# Patient Record
Sex: FEMALE | Race: WHITE | Employment: UNEMPLOYED | URBAN - METROPOLITAN AREA
[De-identification: names, ages, dates, MRNs, and addresses within clinical notes are randomized per-mention and may not be internally consistent; named-entity substitution may affect disease eponyms.]

---

## 2021-08-17 ENCOUNTER — HOSPITAL ENCOUNTER (EMERGENCY)
Age: 46
Discharge: HOME | End: 2021-08-17
Attending: EMERGENCY MEDICINE
Payer: MEDICAID

## 2021-08-17 VITALS
WEIGHT: 168.87 LBS | HEART RATE: 73 BPM | BODY MASS INDEX: 26.51 KG/M2 | RESPIRATION RATE: 18 BRPM | SYSTOLIC BLOOD PRESSURE: 131 MMHG | HEIGHT: 67 IN | DIASTOLIC BLOOD PRESSURE: 84 MMHG | OXYGEN SATURATION: 97 % | TEMPERATURE: 97.8 F

## 2021-08-17 DIAGNOSIS — F31.9 BIPOLAR 1 DISORDER (HCC): Primary | ICD-10-CM

## 2021-08-17 PROCEDURE — 99281 EMR DPT VST MAYX REQ PHY/QHP: CPT

## 2021-08-17 RX ORDER — QUETIAPINE FUMARATE 100 MG/1
100 TABLET, FILM COATED ORAL 2 TIMES DAILY
COMMUNITY

## 2021-08-17 RX ORDER — CLONIDINE HYDROCHLORIDE 0.2 MG/1
0.2 TABLET ORAL
COMMUNITY

## 2021-08-17 RX ORDER — ATOMOXETINE 60 MG/1
60 CAPSULE ORAL DAILY
COMMUNITY

## 2021-08-17 RX ORDER — NORETHINDRONE 5 MG/1
5 TABLET ORAL DAILY
COMMUNITY

## 2021-08-17 NOTE — BSMART NOTE
Comprehensive Assessment Form Part 1      Section I - Disposition    Axis I - Bipolar I Disorder by hx  Axis II - Deferred  Axis III -   Past Medical History:   Diagnosis Date    Anemia     Anxiety     Liver disease     Seizure [facility])        The Medical Doctor to Psychiatrist conference was not completed. The Medical Doctor is in agreement with Psychiatrist disposition because of (reason) patient not meeting criteria for inpatient admission. The plan is discharge and follow-up with psychiatrist.  The on-call Psychiatrist consulted was Dr. Bryson Jacob. The admitting Psychiatrist will be Dr. Bryson Jacob. The admitting Diagnosis is N/A. The Payor source is [address] [place] [address]  Section II - Integrated Summary    Summary:  Patient is a 48 y.o. female arriving to the ED per triage, \"Pt ambulatory requesting to be detoxed from clonidine. Pt denies SI/HI/hallucinations. She states she does not feel like she needs to be admitted for mental health. She was told she needed to come to the ED to be detoxed. \"    Patient is alert and oriented x 4. Patient presents as anxious. Patient is cooperative and pleasant. Patient denies SI/HI/AVH. Patient reported a history of Bipolar Disorder and stated she came off of a 6 month manic episode last Friday. Patient reported she was taking Delsym everyday since February to \"help mellow me out. \" Patient reported she was still taking the Delsym and stated, \"I guess I'm still addicted. \" Patient reported she was prescribed Clonidine by her psychiatrist as panic attacks often come with her manic episodes. Patient reported she had run out of Clonidine a few days earlier then she could get a refill because someone had taken a few of her pills. Patient reported she was told that she would have to go to the hospital for detox if she was going to be out of her Clonidine. Patient reported she has since had her Clonidine refilled.  Patient reported she is moving into a depressive episode and \"I'm worried the chemicals in my brain are off and I probably need a change in medications\". Patient reported feeling concerned as she is not thinking clearly and not making the right decisions. Patient is currently prescribed Hydroxyzine, Seroquel, Prozac, and Stratera. Patient reported taking medication as prescribed. Patient has an appointment on 8/25 with her psychiatrist, Dr. Talha Campuzano. Patient has a prior psychiatric hospitalization in 2014 for substance abuse detox. Patient denies current drug use. Patient to be discharged and follow-up with psychiatrist. Patient encouraged to call psychiatrist in the morning to see if she can be seen sooner than next appointment. Patient provided with list of therapy providers. Patient was also given information for hospital that has a detox unit. The patienthas demonstrated mental capacity to provide informed consent. The information is given by the patient. The Chief Complaint is Clonidine detox. The Precipitant Factors are None noted. Previous Hospitalizations: Yes-2014 [facility]  The patient has not previously been in restraints. Current Psychiatrist and/or  is Dr. Michelle Mari (appt on 8/25)    Lethality Assessment:    The potential for suicide noted by the following: not noted. The potential for homicide is not noted. The patient has not been a perpetrator of sexual or physical abuse. There are not pending charges. The patient is not felt to be at risk for self harm or harm to others. The attending nurse was advised that security has not been notified. Section III - Psychosocial  The patient's overall mood and attitude is anxious and cooperative. Feelings of helplessness and hopelessness are not observed. Generalized anxiety is not observed. Panic is not observed. Phobias are not observed. Obsessive compulsive tendencies are not observed. Section IV - Mental Status Exam  The patient's appearance shows no evidence of impairment.   The patient's behavior shows no evidence of impairment. The patient is oriented to time, place, person and situation. The patient's speech shows no evidence of impairment. The patient's mood is anxious. The range of affect shows no evidence of impairment. The patient's thought content demonstrates no evidence of impairment. The thought process shows no evidence of impairment. The patient's perception shows no evidence of impairment. The patient's memory shows no evidence of impairment. The patient's appetite shows no evidence of impairment. The patient's sleep shows no evidence of impairment. The patient's insight shows no evidence of impairment. The patient's judgement shows no evidence of impairment. Section V - Substance Abuse  The patient is not using substances. Section VI - Living Arrangements  The patient is single. The patient lives with a child. The patient has one child age. The patient does plan to return home upon discharge. The patient does not have legal issues pending. The patient's source of income comes from disability. Protestant and cultural practices have not been voiced at this time. The patient's greatest support comes from son and this person will not be involved with the treatment. The patient has not been in an event described as horrible or outside the realm of ordinary life experience either currently or in the past.  The patient has not been a victim of sexual/physical abuse. Section VII - Other Areas of Clinical Concern  The highest grade achieved is not noted with the overall quality of school experience being described as not noted. The patient is currently disabled and speaks [place] as a primary language. The patient has no communication impairments affecting communication. The patient's preference for learning can be described as: can read and write adequately.   The patient's hearing is normal.  The patient's vision is normal.      Jc Magaña MSW

## 2021-08-17 NOTE — ED NOTES
Pt given written and verbal discharge instructions, 0 Rx; pt verbalized understanding of such. VSS at time of discharge. Belongings in pt possession at time of discharge. Pt ambulatory out of ED without difficulty in NAD. No complaints, needs, or questions at this time. Pt to call psychiatrist ASAP for follow-up.

## 2021-08-17 NOTE — ED TRIAGE NOTES
Pt ambulatory requesting to be detoxed from clonidine. Pt denies SI/HI/hallucinations. She states she does not feel like she needs to be admitted for mental health. She was told she needed to come to the ED to be detoxed.

## 2021-08-17 NOTE — ED PROVIDER NOTES
HPI     52-year-old female with a history of bipolar disorder, anxiety, presents to the emergency department requesting detox from her clonidine. Patient states she just got off of a manic episode and is feeling anxious. She wants to stop her clonidine. She does have a psychiatrist.  She denies feeling suicidal or homicidal.  She denies hallucinations. She denies any fever chest pain cough shortness of breath. She states she has not been vaccinated against Covid. She denies any nausea vomiting or diarrhea. Past Medical History:   Diagnosis Date    Anemia     Anxiety     Liver disease     Seizure [facility])        Past Surgical History:   Procedure Laterality Date    HX OTHER SURGICAL      left arm broken         No family history on file. Social History     Socioeconomic History    Marital status: SINGLE     Spouse name: Not on file    Number of children: Not on file    Years of education: Not on file    Highest education level: Not on file   Occupational History    Not on file   Tobacco Use    Smoking status: Never Smoker   Substance and Sexual Activity    Alcohol use: Yes     Comment: three to five beers a day    Drug use: Not on file    Sexual activity: Not on file   Other Topics Concern    Not on file   Social History Narrative    Not on file     Social Determinants of Health     Financial Resource Strain:     Difficulty of Paying Living Expenses:    Food Insecurity:     Worried About Running Out of Food in the Last Year:     [address] in the Last Year:    Transportation Needs:     Lack of Transportation (Medical):      Lack of Transportation (Non-Medical):    Physical Activity:     Days of Exercise per Week:     Minutes of Exercise per Session:    Stress:     Feeling of Stress :    Social Connections:     Frequency of Communication with Friends and Family:     Frequency of Social Gatherings with Friends and Family:     Attends Pentecostalism Services:     Active Member of Clubs or Organizations:     Attends Club or Organization Meetings:     Marital Status:    Intimate Partner Violence:     Fear of Current or Ex-Partner:     Emotionally Abused:     Physically Abused:     Sexually Abused: ALLERGIES: Patient has no known allergies. Review of Systems   Constitutional: Negative for fever. Eyes: Negative for visual disturbance. Respiratory: Negative for cough and shortness of breath. Cardiovascular: Negative for chest pain. Gastrointestinal: Negative for nausea and vomiting. Endocrine: Negative for polyuria. Genitourinary: Negative for dysuria. Musculoskeletal: Positive for gait problem. Neurological: Negative for headaches. Psychiatric/Behavioral: Positive for dysphoric mood. Negative for hallucinations and suicidal ideas. The patient is nervous/anxious. Vitals:    08/17/21 0251   BP: 131/84   Pulse: 73   Resp: 18   Temp: 97.8 °F (36.6 °C)   SpO2: 97%   Weight: 76.6 kg (168 lb 14 oz)   Height: 5' 7\" (1.702 m)            Physical Exam  Constitutional:       General: She is not in acute distress. Appearance: She is well-developed. HENT:      Head: Normocephalic and atraumatic. Mouth/Throat:      Pharynx: No oropharyngeal exudate. Eyes:      General: No scleral icterus. Right eye: No discharge. Left eye: No discharge. Pupils: Pupils are equal, round, and reactive to light. Neck:      Vascular: No JVD. Cardiovascular:      Rate and Rhythm: Normal rate and regular rhythm. Heart sounds: Normal heart sounds. No murmur heard. Pulmonary:      Effort: Pulmonary effort is normal. No respiratory distress. Breath sounds: Normal breath sounds. No stridor. No wheezing or rales. Chest:      Chest wall: No tenderness. Abdominal:      General: Bowel sounds are normal. There is no distension. Palpations: Abdomen is soft. There is no mass. Tenderness: There is no abdominal tenderness.  There is no guarding or rebound. Musculoskeletal:         General: Normal range of motion. Cervical back: Normal range of motion and neck supple. Skin:     General: Skin is warm and dry. Capillary Refill: Capillary refill takes less than 2 seconds. Findings: No rash. Neurological:      Mental Status: She is oriented to person, place, and time. Psychiatric:         Behavior: Behavior normal.         Thought Content: Thought content normal.         Judgment: Judgment normal.          MDM       Procedures    Patient was evaluated by mental health. They do not feel she meets criteria for admission. Patient has follow-up with her psychiatrist next week.

## 2021-08-17 NOTE — DISCHARGE INSTRUCTIONS
You have been evaluated by our mental health provider in the ED. You have follow up with your psychiatrist next week. Call and see if you can be seen earlier. Continue all your medications as prescribed. If you start having thoughts of harming yourself/suicidal, return here.

## 2022-04-25 ENCOUNTER — HOSPITAL ENCOUNTER (EMERGENCY)
Age: 47
Discharge: HOME | End: 2022-04-25
Attending: STUDENT IN AN ORGANIZED HEALTH CARE EDUCATION/TRAINING PROGRAM
Payer: MEDICAID

## 2022-04-25 VITALS
WEIGHT: 124 LBS | SYSTOLIC BLOOD PRESSURE: 139 MMHG | HEIGHT: 67 IN | OXYGEN SATURATION: 100 % | TEMPERATURE: 98.2 F | BODY MASS INDEX: 19.46 KG/M2 | HEART RATE: 93 BPM | DIASTOLIC BLOOD PRESSURE: 91 MMHG | RESPIRATION RATE: 18 BRPM

## 2022-04-25 DIAGNOSIS — F31.9 BIPOLAR 1 DISORDER (HCC): Primary | ICD-10-CM

## 2022-04-25 DIAGNOSIS — F43.9 STRESS: ICD-10-CM

## 2022-04-25 DIAGNOSIS — F51.01 PRIMARY INSOMNIA: ICD-10-CM

## 2022-04-25 DIAGNOSIS — E86.0 DEHYDRATION: ICD-10-CM

## 2022-04-25 LAB
ALBUMIN SERPL-MCNC: 3.8 G/DL (ref 3.4–5)
ALBUMIN/GLOB SERPL: 1 {RATIO} (ref 0.8–1.7)
ALP SERPL-CCNC: 48 U/L (ref 45–117)
ALT SERPL-CCNC: 41 U/L (ref 13–56)
AMPHET UR QL SCN: NEGATIVE
ANION GAP SERPL CALC-SCNC: 3 MMOL/L (ref 3–18)
APAP SERPL-MCNC: <2 UG/ML (ref 10–30)
APPEARANCE UR: CLEAR
AST SERPL-CCNC: 38 U/L (ref 10–38)
BARBITURATES UR QL SCN: NEGATIVE
BASOPHILS # BLD: 0.1 K/UL (ref 0–0.1)
BASOPHILS NFR BLD: 1 % (ref 0–2)
BENZODIAZ UR QL: NEGATIVE
BILIRUB SERPL-MCNC: 0.7 MG/DL (ref 0.2–1)
BILIRUB UR QL: NEGATIVE
BUN SERPL-MCNC: 7 MG/DL (ref 7–18)
BUN/CREAT SERPL: 10 (ref 12–20)
CALCIUM SERPL-MCNC: 8.7 MG/DL (ref 8.5–10.1)
CANNABINOIDS UR QL SCN: NEGATIVE
CHLORIDE SERPL-SCNC: 108 MMOL/L (ref 100–111)
CO2 SERPL-SCNC: 25 MMOL/L (ref 21–32)
COCAINE UR QL SCN: NEGATIVE
COLOR UR: YELLOW
COVID-19 RAPID TEST, COVR: NOT DETECTED
CREAT SERPL-MCNC: 0.7 MG/DL (ref 0.6–1.3)
DIFFERENTIAL METHOD BLD: ABNORMAL
EOSINOPHIL # BLD: 0.1 K/UL (ref 0–0.4)
EOSINOPHIL NFR BLD: 1 % (ref 0–5)
ERYTHROCYTE [DISTWIDTH] IN BLOOD BY AUTOMATED COUNT: 14.6 % (ref 11.6–14.5)
ETHANOL SERPL-MCNC: <3 MG/DL (ref 0–3)
GLOBULIN SER CALC-MCNC: 3.9 G/DL (ref 2–4)
GLUCOSE SERPL-MCNC: 69 MG/DL (ref 74–99)
GLUCOSE UR STRIP.AUTO-MCNC: NEGATIVE MG/DL
HCG UR QL: NEGATIVE
HCT VFR BLD AUTO: 37 % (ref 35–45)
HDSCOM,HDSCOM: NORMAL
HGB BLD-MCNC: 12.4 G/DL (ref 12–16)
HGB UR QL STRIP: NEGATIVE
IMM GRANULOCYTES # BLD AUTO: 0 K/UL (ref 0–0.04)
IMM GRANULOCYTES NFR BLD AUTO: 0 % (ref 0–0.5)
KETONES UR QL STRIP.AUTO: NEGATIVE MG/DL
LEUKOCYTE ESTERASE UR QL STRIP.AUTO: NEGATIVE
LYMPHOCYTES # BLD: 1.4 K/UL (ref 0.9–3.6)
LYMPHOCYTES NFR BLD: 15 % (ref 21–52)
MCH RBC QN AUTO: 30.5 PG (ref 24–34)
MCHC RBC AUTO-ENTMCNC: 33.5 G/DL (ref 31–37)
MCV RBC AUTO: 90.9 FL (ref 78–100)
METHADONE UR QL: NEGATIVE
MONOCYTES # BLD: 0.8 K/UL (ref 0.05–1.2)
MONOCYTES NFR BLD: 8 % (ref 3–10)
NEUTS SEG # BLD: 7.3 K/UL (ref 1.8–8)
NEUTS SEG NFR BLD: 75 % (ref 40–73)
NITRITE UR QL STRIP.AUTO: NEGATIVE
NRBC # BLD: 0 K/UL (ref 0–0.01)
NRBC BLD-RTO: 0 PER 100 WBC
OPIATES UR QL: NEGATIVE
PCP UR QL: NEGATIVE
PH UR STRIP: 6.5 [PH] (ref 5–8)
PLATELET # BLD AUTO: 332 K/UL (ref 135–420)
PMV BLD AUTO: 9.8 FL (ref 9.2–11.8)
POTASSIUM SERPL-SCNC: 3.6 MMOL/L (ref 3.5–5.5)
PROT SERPL-MCNC: 7.7 G/DL (ref 6.4–8.2)
PROT UR STRIP-MCNC: NEGATIVE MG/DL
RBC # BLD AUTO: 4.07 M/UL (ref 4.2–5.3)
SALICYLATES SERPL-MCNC: <1.7 MG/DL (ref 2.8–20)
SODIUM SERPL-SCNC: 136 MMOL/L (ref 136–145)
SOURCE, COVRS: NORMAL
SP GR UR REFRACTOMETRY: <1.005 (ref 1–1.03)
UROBILINOGEN UR QL STRIP.AUTO: 0.2 EU/DL (ref 0.2–1)
WBC # BLD AUTO: 9.7 K/UL (ref 4.6–13.2)

## 2022-04-25 PROCEDURE — 82077 ASSAY SPEC XCP UR&BREATH IA: CPT

## 2022-04-25 PROCEDURE — 80179 DRUG ASSAY SALICYLATE: CPT

## 2022-04-25 PROCEDURE — 80307 DRUG TEST PRSMV CHEM ANLYZR: CPT

## 2022-04-25 PROCEDURE — 99284 EMERGENCY DEPT VISIT MOD MDM: CPT

## 2022-04-25 PROCEDURE — 74011250636 HC RX REV CODE- 250/636: Performed by: PHYSICIAN ASSISTANT

## 2022-04-25 PROCEDURE — 81025 URINE PREGNANCY TEST: CPT

## 2022-04-25 PROCEDURE — 80143 DRUG ASSAY ACETAMINOPHEN: CPT

## 2022-04-25 PROCEDURE — 80053 COMPREHEN METABOLIC PANEL: CPT

## 2022-04-25 PROCEDURE — 96361 HYDRATE IV INFUSION ADD-ON: CPT

## 2022-04-25 PROCEDURE — 87635 SARS-COV-2 COVID-19 AMP PRB: CPT

## 2022-04-25 PROCEDURE — 81003 URINALYSIS AUTO W/O SCOPE: CPT

## 2022-04-25 PROCEDURE — 96360 HYDRATION IV INFUSION INIT: CPT

## 2022-04-25 PROCEDURE — 85025 COMPLETE CBC W/AUTO DIFF WBC: CPT

## 2022-04-25 RX ADMIN — SODIUM CHLORIDE 1000 ML: 9 INJECTION, SOLUTION INTRAVENOUS at 17:14

## 2022-04-25 NOTE — ED PROVIDER NOTES
EMERGENCY DEPARTMENT HISTORY AND PHYSICAL EXAM    Date: 4/25/2022  Patient Name: Paolo Donaldson    History of Presenting Illness     Chief Complaint   Patient presents with    Dehydration         History Provided By: Patient    Chief Complaint: \"I don't feel right\", bipolar, \"feel dehydrated\"  Duration: this afternoon  Timing:    Location:   Quality:   Severity:   Modifying Factors:   Associated Symptoms: none       Additional History (Context): Paolo Donaldson is a 50 y.o. female with a history of bipolar disorder, depression presents for evaluation of possible dehydration, possibly manic phase. States she \"doesn't feel right\" but also thinks she is dehydrated. Came to town to help her son at the [facility]. Took her medication this morning, but is having highs and lows, and has been walking around the ED hallways appearing anxious. I asked her if she felt like she wanted to harm herself or others, and she said she \"just can't take it anymore\" but endorses no plan, just that she feels overwhelmed and stressed. Is tearful, and then in the next moment is upbeat and talking with nursing staff. Informed nursing staff she has not been sleeping well and thinks this might be why she doesn't feel well. Denies pain, abdominal pain, N/V, urinary symptoms, headache. Denies tobacco, alcohol or drug use. PCP: None    Current Outpatient Medications   Medication Sig Dispense Refill    norethindrone acetate (AYGESTIN) 5 mg tablet Take 5 mg by mouth daily.  cloNIDine HCL (CATAPRES) 0.2 mg tablet Take 0.2 mg by mouth three (3) times daily as needed.  QUEtiapine (SEROquel) 100 mg tablet Take 100 mg by mouth two (2) times a day. Take 1/2 tablet by mouth daily with breakfast and 1 tablet at bedtime      atomoxetine (STRATTERA) 60 mg capsule Take 60 mg by mouth daily.          Past History     Past Medical History:  Past Medical History:   Diagnosis Date    Anemia     Anxiety     Liver disease     Seizure ([facility] [address]        Past Surgical History:  Past Surgical History:   Procedure Laterality Date    HX OTHER SURGICAL      left arm broken       Family History:  History reviewed. No pertinent family history. Social History:  Social History     Tobacco Use    Smoking status: Never Smoker    Smokeless tobacco: Never Used   Vaping Use    Vaping Use: Never used   Substance Use Topics    Alcohol use: Yes     Comment: three to five beers a day    Drug use: Not Currently       Allergies:  No Known Allergies      Review of Systems   Review of Systems   Constitutional: Positive for fatigue. Negative for chills and fever. HENT: Negative for congestion and trouble swallowing. Respiratory: Negative for shortness of breath and wheezing. Cardiovascular: Negative for chest pain and palpitations. Gastrointestinal: Negative for abdominal pain. Genitourinary: Negative for flank pain and menstrual problem. Musculoskeletal: Negative for arthralgias, back pain and myalgias. Skin: Negative for rash. Neurological: Negative for dizziness, weakness and headaches. Psychiatric/Behavioral: Positive for agitation, dysphoric mood and sleep disturbance. Negative for confusion, hallucinations, self-injury and suicidal ideas. The patient is nervous/anxious. All Other Systems Negative  Physical Exam     Vitals:    04/25/22 1553 04/25/22 1555 04/25/22 1557 04/25/22 1926   BP: (!) 164/104 (!) 157/103 (!) 139/91    Pulse: 89 95 93    Resp:       Temp:       SpO2: 99% 98% 98% 100%   Weight:       Height:         Physical Exam  Constitutional:       Appearance: Normal appearance. Comments: Appears anxious, and tearful during conversation     HENT:      Head: Normocephalic and atraumatic. Mouth/Throat:      Mouth: Mucous membranes are dry. Pharynx: Oropharynx is clear. Eyes:      Extraocular Movements: Extraocular movements intact.       Conjunctiva/sclera: Conjunctivae normal.   Cardiovascular:      Rate and Rhythm: Normal rate and regular rhythm. Pulses: Normal pulses. Heart sounds: Normal heart sounds. Pulmonary:      Effort: Pulmonary effort is normal.      Breath sounds: Normal breath sounds. Abdominal:      Palpations: Abdomen is soft. Musculoskeletal:         General: Normal range of motion. Skin:     General: Skin is warm and dry. Capillary Refill: Capillary refill takes less than 2 seconds. Neurological:      General: No focal deficit present. Mental Status: She is alert and oriented to person, place, and time. Sensory: No sensory deficit. Motor: No weakness. Coordination: Coordination normal.      Gait: Gait normal.   Psychiatric:         Attention and Perception: Attention normal. She is attentive. She does not perceive auditory or visual hallucinations. Mood and Affect: Mood is anxious. Affect is tearful. Speech: Speech is rapid and pressured. Behavior: Behavior normal. Behavior is cooperative. Thought Content: Thought content is not paranoid or delusional. Thought content does not include homicidal or suicidal ideation. Thought content does not include homicidal or suicidal plan.          Cognition and Memory: Cognition normal.           Diagnostic Study Results     Labs -     Recent Results (from the past 12 hour(s))   URINALYSIS W/ RFLX MICROSCOPIC    Collection Time: 04/25/22  3:30 PM   Result Value Ref Range    Color YELLOW      Appearance CLEAR      Specific gravity <1.005 (L) 1.005 - 1.030    pH (UA) 6.5 5.0 - 8.0      Protein Negative NEG mg/dL    Glucose Negative NEG mg/dL    Ketone Negative NEG mg/dL    Bilirubin Negative NEG      Blood Negative NEG      Urobilinogen 0.2 0.2 - 1.0 EU/dL    Nitrites Negative NEG      Leukocyte Esterase Negative NEG     HCG URINE, QL    Collection Time: 04/25/22  3:30 PM   Result Value Ref Range    HCG urine, QL Negative NEG     DRUG SCREEN, URINE    Collection Time: 04/25/22  3:30 PM   Result Value Ref Range BENZODIAZEPINES Negative NEG      BARBITURATES Negative NEG      THC (TH-CANNABINOL) Negative NEG      OPIATES Negative NEG      PCP(PHENCYCLIDINE) Negative NEG      COCAINE Negative NEG      AMPHETAMINES Negative NEG      METHADONE Negative NEG      HDSCOM (NOTE)    CBC WITH AUTOMATED DIFF    Collection Time: 04/25/22  4:00 PM   Result Value Ref Range    WBC 9.7 4.6 - 13.2 K/uL    RBC 4.07 (L) 4.20 - 5.30 M/uL    HGB 12.4 12.0 - 16.0 g/dL    HCT 37.0 35.0 - 45.0 %    MCV 90.9 78.0 - 100.0 FL    MCH 30.5 24.0 - 34.0 PG    MCHC 33.5 31.0 - 37.0 g/dL    RDW 14.6 (H) 11.6 - 14.5 %    PLATELET 304 876 - 462 K/uL    MPV 9.8 9.2 - 11.8 FL    NRBC 0.0 0  WBC    ABSOLUTE NRBC 0.00 0.00 - 0.01 K/uL    NEUTROPHILS 75 (H) 40 - 73 %    LYMPHOCYTES 15 (L) 21 - 52 %    MONOCYTES 8 3 - 10 %    EOSINOPHILS 1 0 - 5 %    BASOPHILS 1 0 - 2 %    IMMATURE GRANULOCYTES 0 0.0 - 0.5 %    ABS. NEUTROPHILS 7.3 1.8 - 8.0 K/UL    ABS. LYMPHOCYTES 1.4 0.9 - 3.6 K/UL    ABS. MONOCYTES 0.8 0.05 - 1.2 K/UL    ABS. EOSINOPHILS 0.1 0.0 - 0.4 K/UL    ABS. BASOPHILS 0.1 0.0 - 0.1 K/UL    ABS. IMM. GRANS. 0.0 0.00 - 0.04 K/UL    DF AUTOMATED     METABOLIC PANEL, COMPREHENSIVE    Collection Time: 04/25/22  4:00 PM   Result Value Ref Range    Sodium 136 136 - 145 mmol/L    Potassium 3.6 3.5 - 5.5 mmol/L    Chloride 108 100 - 111 mmol/L    CO2 25 21 - 32 mmol/L    Anion gap 3 3.0 - 18 mmol/L    Glucose 69 (L) 74 - 99 mg/dL    BUN 7 7.0 - 18 MG/DL    Creatinine 0.70 0.6 - 1.3 MG/DL    BUN/Creatinine ratio 10 (L) 12 - 20      GFR est AA >60 >60 ml/min/1.73m2    GFR est non-AA >60 >60 ml/min/1.73m2    Calcium 8.7 8.5 - 10.1 MG/DL    Bilirubin, total 0.7 0.2 - 1.0 MG/DL    ALT (SGPT) 41 13 - 56 U/L    AST (SGOT) 38 10 - 38 U/L    Alk.  phosphatase 48 45 - 117 U/L    Protein, total 7.7 6.4 - 8.2 g/dL    Albumin 3.8 3.4 - 5.0 g/dL    Globulin 3.9 2.0 - 4.0 g/dL    A-G Ratio 1.0 0.8 - 1.7     ETHYL ALCOHOL    Collection Time: 04/25/22  4:00 PM   Result Value Ref Range    ALCOHOL(ETHYL),SERUM <3 0 - 3 MG/DL   SALICYLATE    Collection Time: 04/25/22  4:00 PM   Result Value Ref Range    Salicylate level <8.1 (L) 2.8 - 20.0 MG/DL   ACETAMINOPHEN    Collection Time: 04/25/22  4:00 PM   Result Value Ref Range    Acetaminophen level <2 (L) 10.0 - 30.0 ug/mL   COVID-19 RAPID TEST    Collection Time: 04/25/22  4:15 PM   Result Value Ref Range    Specimen source Nasopharyngeal      COVID-19 rapid test Not detected NOTD         Radiologic Studies -   No orders to display     CT Results  (Last 48 hours)    None        CXR Results  (Last 48 hours)    None            Medical Decision Making   I am the first provider for this patient. I reviewed the vital signs, available nursing notes, past medical history, past surgical history, family history and social history. Vital Signs-Reviewed the patient's vital signs. Records Reviewed: Nursing Notes and Old Medical Records     Procedures: None   Procedures    Provider Notes (Medical Decision Making): Concern pt is having passive thoughts of not wanting to be here, stress and tearful. Denies plan to hurt herself, history of suicidal attempts. Will get labs for possible case management, rehydrate the patient and reassess. Check for any other abnormalities such as UTI or electrolyte imbalance. 1943: Dr. Mary Ellen Mcneill evaluated at bedside for concerns of possible SI endorsement, denies SI or HI, and denied to nursing staff multiple times. Endorses contract for safety, will return if symptoms get worse. Has family and friens in the area. States she feels much better and is ready to go home. Has psychiatrist, Dr. Donna Francis, and will set up an appointment with them. Return if any worsening. MED RECONCILIATION:  No current facility-administered medications for this encounter. Current Outpatient Medications   Medication Sig    norethindrone acetate (AYGESTIN) 5 mg tablet Take 5 mg by mouth daily.     cloNIDine HCL (CATAPRES) 0.2 mg tablet Take 0.2 mg by mouth three (3) times daily as needed.  QUEtiapine (SEROquel) 100 mg tablet Take 100 mg by mouth two (2) times a day. Take 1/2 tablet by mouth daily with breakfast and 1 tablet at bedtime    atomoxetine (STRATTERA) 60 mg capsule Take 60 mg by mouth daily. Disposition:  Home     DISCHARGE NOTE:   Pt has been reexamined. Patient has no new complaints, changes, or physical findings. Care plan outlined and precautions discussed. Results of workup were reviewed with the patient. All medications were reviewed with the patient. All of pt's questions and concerns were addressed. Patient was instructed and agrees to follow up with PCP as well as to return to the ED upon further deterioration. Patient is ready to go home. Follow-up Information     Follow up With Specialties Details Why [facility]    [facility] EMERGENCY DEPT Emergency Medicine  If symptoms worsen [address] [place]  [phone]    Yg Matos MD Psychiatry Schedule an appointment as soon as possible for a visit   Trevor Jaquez  [address]  [address] [place] [address]  [phone]            Current Discharge Medication List              Diagnosis     Clinical Impression:   1. Bipolar 1 disorder (HCC)    2. Dehydration    3. Stress    4. Primary insomnia          \"Please note that this dictation was completed with [employer], the computer voice recognition software. Quite often unanticipated grammatical, syntax, homophones, and other interpretive errors are inadvertently transcribed by the computer software. Please disregard these errors. Please excuse any errors that have escaped final proofreading. \"

## 2022-04-25 NOTE — DISCHARGE INSTRUCTIONS
Follow up with your psychiatrist/primary care provider to discuss your medication.    Return to the ER if you develop any worsening symptoms such as pain, fever, vomiting

## 2025-05-06 ENCOUNTER — APPOINTMENT (OUTPATIENT)
Facility: HOSPITAL | Age: 50
End: 2025-05-06
Payer: COMMERCIAL

## 2025-05-06 VITALS
BODY MASS INDEX: 22.46 KG/M2 | OXYGEN SATURATION: 100 % | TEMPERATURE: 98.4 F | HEIGHT: 67 IN | SYSTOLIC BLOOD PRESSURE: 134 MMHG | HEART RATE: 88 BPM | WEIGHT: 143.08 LBS | RESPIRATION RATE: 20 BRPM | DIASTOLIC BLOOD PRESSURE: 90 MMHG

## 2025-05-06 PROCEDURE — 71046 X-RAY EXAM CHEST 2 VIEWS: CPT

## 2025-05-06 PROCEDURE — 99284 EMERGENCY DEPT VISIT MOD MDM: CPT

## 2025-05-06 ASSESSMENT — PAIN - FUNCTIONAL ASSESSMENT: PAIN_FUNCTIONAL_ASSESSMENT: NONE - DENIES PAIN

## 2025-05-07 ENCOUNTER — HOSPITAL ENCOUNTER (EMERGENCY)
Facility: HOSPITAL | Age: 50
Discharge: HOME | End: 2025-05-07
Attending: STUDENT IN AN ORGANIZED HEALTH CARE EDUCATION/TRAINING PROGRAM
Payer: COMMERCIAL

## 2025-05-07 DIAGNOSIS — J06.9 UPPER RESPIRATORY TRACT INFECTION, UNSPECIFIED TYPE: Primary | ICD-10-CM

## 2025-05-07 DIAGNOSIS — R05.1 ACUTE COUGH: ICD-10-CM

## 2025-05-07 LAB
FLUAV RNA SPEC QL NAA+PROBE: NOT DETECTED
FLUBV RNA SPEC QL NAA+PROBE: NOT DETECTED
SARS-COV-2 RNA RESP QL NAA+PROBE: NOT DETECTED
SOURCE: NORMAL

## 2025-05-07 PROCEDURE — 87636 SARSCOV2 & INF A&B AMP PRB: CPT

## 2025-05-07 RX ORDER — BENZONATATE 100 MG/1
100 CAPSULE ORAL 3 TIMES DAILY PRN
Qty: 30 CAPSULE | Refills: 0 | Status: SHIPPED | OUTPATIENT
Start: 2025-05-07 | End: 2025-05-07

## 2025-05-07 RX ORDER — ALBUTEROL SULFATE 90 UG/1
2 INHALANT RESPIRATORY (INHALATION) 4 TIMES DAILY PRN
Qty: 18 G | Refills: 0 | Status: SHIPPED | OUTPATIENT
Start: 2025-05-07

## 2025-05-07 RX ORDER — ALBUTEROL SULFATE 90 UG/1
2 INHALANT RESPIRATORY (INHALATION) 4 TIMES DAILY PRN
Qty: 18 G | Refills: 0 | Status: SHIPPED | OUTPATIENT
Start: 2025-05-07 | End: 2025-05-07

## 2025-05-07 RX ORDER — BENZONATATE 100 MG/1
100 CAPSULE ORAL 3 TIMES DAILY PRN
Qty: 30 CAPSULE | Refills: 0 | Status: SHIPPED | OUTPATIENT
Start: 2025-05-07 | End: 2025-05-17

## 2025-05-07 ASSESSMENT — ENCOUNTER SYMPTOMS: COUGH: 1

## 2025-05-07 NOTE — ED TRIAGE NOTES
Patient in through triage with complaints of productive cough. Family at home sick as well with unknown illness. Pt stumbling and slurred in triage, endorses having a single glass of wine pta.

## 2025-05-07 NOTE — ED PROVIDER NOTES
[facility] EMERGENCY DEPARTMENT  EMERGENCY DEPARTMENT ENCOUNTER      Pt Name: Elvira Reid  MRN: [id number]  Birthdate 1975  Date of evaluation: 5/6/2025  Provider: Gianni Dominguez DO    CHIEF COMPLAINT       Chief Complaint   Patient presents with    Cough         HISTORY OF PRESENT ILLNESS   (Location/Symptom, Timing/Onset, Context/Setting, Quality, Duration, Modifying Factors, Severity)  Note limiting factors.   50-year-old female history as outlined below here today secondary to cough.  She has had cough which is productive for 1 week.  Has had associated nasal/chest congestion.  + Ill contacts with the same.  Not really feeling short of breath.  No fevers.  No chest pain, abdominal pain, leg pain or leg swelling.  She smokes occasionally.  Denies history of COPD or asthma.            Review of External Medical Records:     Nursing Notes were reviewed.    REVIEW OF SYSTEMS    (2-9 systems for level 4, 10 or more for level 5)     Review of Systems   HENT:  Positive for congestion.    Respiratory:  Positive for cough.        Except as noted above the remainder of the review of systems was reviewed and negative.       PAST MEDICAL HISTORY     Past Medical History:   Diagnosis Date    Anemia     Anxiety     Liver disease     Seizure (HCC)          SURGICAL HISTORY       Past Surgical History:   Procedure Laterality Date    OTHER SURGICAL HISTORY      left arm broken    US I&D BREAST ABSCESS DEEP N/A 5/6/2016    US I&D BREAST ABSCESS DEEP         CURRENT MEDICATIONS       Previous Medications    ATOMOXETINE (STRATTERA) 60 MG CAPSULE    Take 60 mg by mouth daily    CLONIDINE (CATAPRES) 0.2 MG TABLET    Take 0.2 mg by mouth 3 times daily as needed    NORETHINDRONE (AYGESTIN) 5 MG TABLET    Take 5 mg by mouth daily    QUETIAPINE (SEROQUEL) 100 MG TABLET    Take 100 mg by mouth 2 times daily       ALLERGIES     Patient has no known allergies.    FAMILY HISTORY     No family history on file.       SOCIAL

## 2025-05-07 NOTE — ED NOTES
Patient discharged by MD Juliana Dominguez pt sent to the front lobby, with strong and steady gait, no acute distress noted at time of discharge - Discharge information / home RX / and reasons to return to the ED were reviewed by the ED provider.

## 2025-05-26 ENCOUNTER — HOSPITAL ENCOUNTER (INPATIENT)
Facility: HOSPITAL | Age: 50
LOS: 2 days | Discharge: HOME | DRG: 753 | End: 2025-05-29
Attending: EMERGENCY MEDICINE | Admitting: PSYCHIATRY & NEUROLOGY
Payer: COMMERCIAL

## 2025-05-26 DIAGNOSIS — F32.A DEPRESSION, UNSPECIFIED DEPRESSION TYPE: Primary | ICD-10-CM

## 2025-05-26 LAB
ALBUMIN SERPL-MCNC: 3.5 G/DL (ref 3.5–5)
ALBUMIN/GLOB SERPL: 1.1 (ref 1.1–2.2)
ALP SERPL-CCNC: 58 U/L (ref 45–117)
ALT SERPL-CCNC: 28 U/L (ref 12–78)
AMPHET UR QL SCN: POSITIVE
ANION GAP SERPL CALC-SCNC: 1 MMOL/L (ref 2–12)
APAP SERPL-MCNC: <2 UG/ML (ref 10–30)
APPEARANCE UR: CLEAR
AST SERPL-CCNC: 31 U/L (ref 15–37)
BACTERIA URNS QL MICRO: NEGATIVE /HPF
BARBITURATES UR QL SCN: NEGATIVE
BASOPHILS # BLD: 0.06 K/UL (ref 0–0.1)
BASOPHILS NFR BLD: 0.8 % (ref 0–1)
BENZODIAZ UR QL: NEGATIVE
BILIRUB SERPL-MCNC: 0.2 MG/DL (ref 0.2–1)
BILIRUB UR QL: NEGATIVE
BUN SERPL-MCNC: 12 MG/DL (ref 6–20)
BUN/CREAT SERPL: 13 (ref 12–20)
CALCIUM SERPL-MCNC: 9.4 MG/DL (ref 8.5–10.1)
CANNABINOIDS UR QL SCN: NEGATIVE
CHLORIDE SERPL-SCNC: 99 MMOL/L (ref 97–108)
CO2 SERPL-SCNC: 31 MMOL/L (ref 21–32)
COCAINE UR QL SCN: NEGATIVE
COLOR UR: NORMAL
COMMENT:: NORMAL
CREAT SERPL-MCNC: 0.9 MG/DL (ref 0.55–1.02)
DIFFERENTIAL METHOD BLD: ABNORMAL
EOSINOPHIL # BLD: 0.27 K/UL (ref 0–0.4)
EOSINOPHIL NFR BLD: 3.4 % (ref 0–7)
EPITH CASTS URNS QL MICRO: NORMAL /LPF
ERYTHROCYTE [DISTWIDTH] IN BLOOD BY AUTOMATED COUNT: 14.2 % (ref 11.5–14.5)
ETHANOL SERPL-MCNC: <10 MG/DL (ref 0–0.08)
GLOBULIN SER CALC-MCNC: 3.1 G/DL (ref 2–4)
GLUCOSE SERPL-MCNC: 151 MG/DL (ref 65–100)
GLUCOSE UR STRIP.AUTO-MCNC: NEGATIVE MG/DL
HCT VFR BLD AUTO: 34.4 % (ref 35–47)
HGB BLD-MCNC: 11.4 G/DL (ref 11.5–16)
HGB UR QL STRIP: NEGATIVE
HYALINE CASTS URNS QL MICRO: NORMAL /LPF (ref 0–5)
IMM GRANULOCYTES # BLD AUTO: 0.02 K/UL (ref 0–0.04)
IMM GRANULOCYTES NFR BLD AUTO: 0.3 % (ref 0–0.5)
KETONES UR QL STRIP.AUTO: NEGATIVE MG/DL
LEUKOCYTE ESTERASE UR QL STRIP.AUTO: NEGATIVE
LYMPHOCYTES # BLD: 1.77 K/UL (ref 0.8–3.5)
LYMPHOCYTES NFR BLD: 22.3 % (ref 12–49)
Lab: ABNORMAL
MCH RBC QN AUTO: 30.1 PG (ref 26–34)
MCHC RBC AUTO-ENTMCNC: 33.1 G/DL (ref 30–36.5)
MCV RBC AUTO: 90.8 FL (ref 80–99)
METHADONE UR QL: NEGATIVE
MONOCYTES # BLD: 0.7 K/UL (ref 0–1)
MONOCYTES NFR BLD: 8.8 % (ref 5–13)
NEUTS SEG # BLD: 5.13 K/UL (ref 1.8–8)
NEUTS SEG NFR BLD: 64.4 % (ref 32–75)
NITRITE UR QL STRIP.AUTO: NEGATIVE
NRBC # BLD: 0 K/UL (ref 0–0.01)
NRBC BLD-RTO: 0 PER 100 WBC
OPIATES UR QL: NEGATIVE
PCP UR QL: NEGATIVE
PH UR STRIP: 6.5 (ref 5–8)
PLATELET # BLD AUTO: 280 K/UL (ref 150–400)
PMV BLD AUTO: 9.5 FL (ref 8.9–12.9)
POTASSIUM SERPL-SCNC: 3.8 MMOL/L (ref 3.5–5.1)
PROT SERPL-MCNC: 6.6 G/DL (ref 6.4–8.2)
PROT UR STRIP-MCNC: NEGATIVE MG/DL
RBC # BLD AUTO: 3.79 M/UL (ref 3.8–5.2)
RBC #/AREA URNS HPF: NORMAL /HPF (ref 0–5)
SALICYLATES SERPL-MCNC: <1.7 MG/DL (ref 2.8–20)
SODIUM SERPL-SCNC: 131 MMOL/L (ref 136–145)
SP GR UR REFRACTOMETRY: 1 (ref 1–1.03)
SPECIMEN HOLD: NORMAL
SPECIMEN HOLD: NORMAL
UROBILINOGEN UR QL STRIP.AUTO: 0.2 EU/DL (ref 0.2–1)
WBC # BLD AUTO: 8 K/UL (ref 3.6–11)
WBC URNS QL MICRO: NORMAL /HPF (ref 0–4)

## 2025-05-26 PROCEDURE — 80307 DRUG TEST PRSMV CHEM ANLYZR: CPT

## 2025-05-26 PROCEDURE — 81001 URINALYSIS AUTO W/SCOPE: CPT

## 2025-05-26 PROCEDURE — 6360000002 HC RX W HCPCS: Performed by: EMERGENCY MEDICINE

## 2025-05-26 PROCEDURE — 80053 COMPREHEN METABOLIC PANEL: CPT

## 2025-05-26 PROCEDURE — 90791 PSYCH DIAGNOSTIC EVALUATION: CPT

## 2025-05-26 PROCEDURE — 99285 EMERGENCY DEPT VISIT HI MDM: CPT

## 2025-05-26 PROCEDURE — 96374 THER/PROPH/DIAG INJ IV PUSH: CPT

## 2025-05-26 PROCEDURE — 85025 COMPLETE CBC W/AUTO DIFF WBC: CPT

## 2025-05-26 PROCEDURE — 2580000003 HC RX 258: Performed by: EMERGENCY MEDICINE

## 2025-05-26 PROCEDURE — 80143 DRUG ASSAY ACETAMINOPHEN: CPT

## 2025-05-26 PROCEDURE — 82077 ASSAY SPEC XCP UR&BREATH IA: CPT

## 2025-05-26 PROCEDURE — 80179 DRUG ASSAY SALICYLATE: CPT

## 2025-05-26 RX ORDER — THIAMINE HYDROCHLORIDE 100 MG/ML
200 INJECTION, SOLUTION INTRAMUSCULAR; INTRAVENOUS ONCE
Status: COMPLETED | OUTPATIENT
Start: 2025-05-26 | End: 2025-05-26

## 2025-05-26 RX ORDER — SODIUM CHLORIDE, SODIUM LACTATE, POTASSIUM CHLORIDE, AND CALCIUM CHLORIDE .6; .31; .03; .02 G/100ML; G/100ML; G/100ML; G/100ML
1000 INJECTION, SOLUTION INTRAVENOUS ONCE
Status: COMPLETED | OUTPATIENT
Start: 2025-05-26 | End: 2025-05-27

## 2025-05-26 RX ADMIN — THIAMINE HYDROCHLORIDE 200 MG: 100 INJECTION, SOLUTION INTRAMUSCULAR; INTRAVENOUS at 22:39

## 2025-05-26 RX ADMIN — SODIUM CHLORIDE, SODIUM LACTATE, POTASSIUM CHLORIDE, AND CALCIUM CHLORIDE 1000 ML: .6; .31; .03; .02 INJECTION, SOLUTION INTRAVENOUS at 22:39

## 2025-05-26 ASSESSMENT — ENCOUNTER SYMPTOMS
SHORTNESS OF BREATH: 0
DIARRHEA: 0
BLOOD IN STOOL: 0
COUGH: 0
ABDOMINAL DISTENTION: 0
ANAL BLEEDING: 0
NAUSEA: 0
ABDOMINAL PAIN: 0
VOMITING: 0

## 2025-05-26 ASSESSMENT — PAIN - FUNCTIONAL ASSESSMENT: PAIN_FUNCTIONAL_ASSESSMENT: NONE - DENIES PAIN

## 2025-05-27 PROBLEM — F31.9 BIPOLAR 1 DISORDER (HCC): Status: ACTIVE | Noted: 2025-05-27

## 2025-05-27 LAB — HCG UR QL: NEGATIVE

## 2025-05-27 PROCEDURE — 81025 URINE PREGNANCY TEST: CPT

## 2025-05-27 PROCEDURE — 1240000000 HC EMOTIONAL WELLNESS R&B

## 2025-05-27 PROCEDURE — 6370000000 HC RX 637 (ALT 250 FOR IP): Performed by: PSYCHIATRY & NEUROLOGY

## 2025-05-27 RX ORDER — HYDROXYZINE PAMOATE 50 MG/1
50 CAPSULE ORAL 2 TIMES DAILY PRN
COMMUNITY

## 2025-05-27 RX ORDER — ACETAMINOPHEN 325 MG/1
650 TABLET ORAL EVERY 4 HOURS PRN
Status: DISCONTINUED | OUTPATIENT
Start: 2025-05-27 | End: 2025-05-29 | Stop reason: HOSPADM

## 2025-05-27 RX ORDER — ACAMPROSATE CALCIUM 333 MG/1
333 TABLET, DELAYED RELEASE ORAL 3 TIMES DAILY
COMMUNITY

## 2025-05-27 RX ORDER — SENNOSIDES 8.6 MG/1
1 TABLET ORAL DAILY PRN
Status: DISCONTINUED | OUTPATIENT
Start: 2025-05-27 | End: 2025-05-29 | Stop reason: HOSPADM

## 2025-05-27 RX ORDER — FLUOXETINE 10 MG/1
10 TABLET, FILM COATED ORAL DAILY
Status: ON HOLD | COMMUNITY
End: 2025-05-29 | Stop reason: HOSPADM

## 2025-05-27 RX ORDER — MAGNESIUM HYDROXIDE/ALUMINUM HYDROXICE/SIMETHICONE 120; 1200; 1200 MG/30ML; MG/30ML; MG/30ML
30 SUSPENSION ORAL EVERY 6 HOURS PRN
Status: DISCONTINUED | OUTPATIENT
Start: 2025-05-27 | End: 2025-05-29 | Stop reason: HOSPADM

## 2025-05-27 RX ORDER — HALOPERIDOL 5 MG/ML
5 INJECTION INTRAMUSCULAR EVERY 4 HOURS PRN
Status: DISCONTINUED | OUTPATIENT
Start: 2025-05-27 | End: 2025-05-29 | Stop reason: HOSPADM

## 2025-05-27 RX ORDER — DIPHENHYDRAMINE HYDROCHLORIDE 50 MG/ML
INJECTION, SOLUTION INTRAMUSCULAR; INTRAVENOUS
Status: DISCONTINUED
Start: 2025-05-27 | End: 2025-05-27

## 2025-05-27 RX ORDER — LISDEXAMFETAMINE DIMESYLATE 60 MG/1
60 CAPSULE ORAL DAILY
COMMUNITY

## 2025-05-27 RX ORDER — CLONIDINE HYDROCHLORIDE 0.1 MG/1
0.1 TABLET ORAL 2 TIMES DAILY
Status: DISCONTINUED | OUTPATIENT
Start: 2025-05-27 | End: 2025-05-29

## 2025-05-27 RX ORDER — DIPHENHYDRAMINE HYDROCHLORIDE 50 MG/ML
50 INJECTION, SOLUTION INTRAMUSCULAR; INTRAVENOUS EVERY 4 HOURS PRN
Status: DISCONTINUED | OUTPATIENT
Start: 2025-05-27 | End: 2025-05-29 | Stop reason: HOSPADM

## 2025-05-27 RX ORDER — NICOTINE 21 MG/24HR
1 PATCH, TRANSDERMAL 24 HOURS TRANSDERMAL DAILY
Status: DISCONTINUED | OUTPATIENT
Start: 2025-05-28 | End: 2025-05-29 | Stop reason: HOSPADM

## 2025-05-27 RX ORDER — POLYETHYLENE GLYCOL 3350 17 G/17G
17 POWDER, FOR SOLUTION ORAL DAILY PRN
Status: DISCONTINUED | OUTPATIENT
Start: 2025-05-27 | End: 2025-05-29 | Stop reason: HOSPADM

## 2025-05-27 RX ORDER — TRAZODONE HYDROCHLORIDE 50 MG/1
50 TABLET ORAL NIGHTLY PRN
Status: DISCONTINUED | OUTPATIENT
Start: 2025-05-27 | End: 2025-05-29 | Stop reason: HOSPADM

## 2025-05-27 RX ORDER — HYDROXYZINE HYDROCHLORIDE 50 MG/1
50 TABLET, FILM COATED ORAL 3 TIMES DAILY PRN
Status: DISCONTINUED | OUTPATIENT
Start: 2025-05-27 | End: 2025-05-29 | Stop reason: HOSPADM

## 2025-05-27 RX ORDER — QUETIAPINE FUMARATE 100 MG/1
200 TABLET, FILM COATED ORAL
Status: DISCONTINUED | OUTPATIENT
Start: 2025-05-27 | End: 2025-05-28

## 2025-05-27 RX ORDER — ACAMPROSATE CALCIUM 333 MG/1
666 TABLET, DELAYED RELEASE ORAL 3 TIMES DAILY
Status: DISCONTINUED | OUTPATIENT
Start: 2025-05-27 | End: 2025-05-28

## 2025-05-27 RX ORDER — HALOPERIDOL 5 MG/1
5 TABLET ORAL EVERY 4 HOURS PRN
Status: DISCONTINUED | OUTPATIENT
Start: 2025-05-27 | End: 2025-05-29 | Stop reason: HOSPADM

## 2025-05-27 RX ORDER — LISINOPRIL 5 MG/1
5 TABLET ORAL DAILY
Status: ON HOLD | COMMUNITY
End: 2025-05-28 | Stop reason: ALTCHOICE

## 2025-05-27 RX ADMIN — CLONIDINE HYDROCHLORIDE 0.1 MG: 0.1 TABLET ORAL at 17:29

## 2025-05-27 RX ADMIN — ACAMPROSATE CALCIUM 666 MG: 333 TABLET, DELAYED RELEASE ORAL at 20:48

## 2025-05-27 RX ADMIN — QUETIAPINE FUMARATE 200 MG: 100 TABLET ORAL at 20:48

## 2025-05-27 RX ADMIN — HYDROXYZINE HYDROCHLORIDE 50 MG: 50 TABLET ORAL at 18:23

## 2025-05-27 ASSESSMENT — SLEEP AND FATIGUE QUESTIONNAIRES
AVERAGE NUMBER OF SLEEP HOURS: 8
DO YOU HAVE DIFFICULTY SLEEPING: NO
DO YOU USE A SLEEP AID: NO

## 2025-05-27 ASSESSMENT — PAIN SCALES - GENERAL: PAINLEVEL_OUTOF10: 0

## 2025-05-27 ASSESSMENT — LIFESTYLE VARIABLES
HOW OFTEN DO YOU HAVE A DRINK CONTAINING ALCOHOL: NEVER
HOW MANY STANDARD DRINKS CONTAINING ALCOHOL DO YOU HAVE ON A TYPICAL DAY: PATIENT DOES NOT DRINK

## 2025-05-27 NOTE — BSMART NOTE
Patient was accepted to [facility] bed 729-2 by GENEVIEVE Portillo on behalf of Dr. Britton. The number for report is ext [phone]. Patient's nurse notified.

## 2025-05-27 NOTE — BH NOTE
4 Eyes Skin Assessment     NAME:  Elvira Reid  YOB: 1975  MEDICAL RECORD NUMBER:  [id number]    The patient is being assessed for  Admission    I agree that at least one RN has performed a thorough Head to Toe Skin Assessment on the patient. ALL assessment sites listed below have been assessed.      Areas assessed by both nurses:    Head, Face, Ears, Shoulders, Back, Chest, Arms, Elbows, Hands, Sacrum. Buttock, Coccyx, Ischium, and Legs. Feet and Heels        Does the Patient have a Wound? No noted wound(s)       Todd Prevention initiated by RN: No  Wound Care Orders initiated by RN: No    Pressure Injury (Stage 3,4, Unstageable, DTI, NWPT, and Complex wounds) if present, place Wound referral order by RN under : No    New Ostomies, if present place, Ostomy referral order under :    Nurse 1 eSignature: Electronically signed by Beth Aquino RN on 5/27/25 at 4:53 PM EDT    **SHARE this note so that the co-signing nurse can place an eSignature**    Nurse 2 eSignature: SHANNON Murphy

## 2025-05-27 NOTE — BH NOTE
TRANSFER - IN REPORT:    Verbal report received from Haylie rhodes Elvira Reid  being received from [facility] ED for routine progression of patient care      Report consisted of patient's Situation, Background, Assessment and   Recommendations(SBAR).     Information from the following report(s) Nurse Handoff Report, Index, ED Encounter Summary, ED SBAR, Adult Overview, MAR, Recent Results, Med Rec Status, Neuro Assessment, and Event Log was reviewed with the receiving nurse.    Opportunity for questions and clarification was provided.      Assessment completed upon patient's arrival to unit and care assumed.

## 2025-05-27 NOTE — VIRTUAL HEALTH
Appetite. She resides at a sober living house. She's had 9 to 10 inpatient psychiatric hospitalizations in the last 5 years. She wishes she could go to sleep and not wake up. She was not able to identify any reasons for living when completing the Safety Plan. She is voluntarily seeking an inpatient psychiatric admission.    Dx: Bipolar    Plan:  Inpatient psychiatric admission at appropriate care level facility, once medically cleared and stable  Ongoing medical management and stabilization per primary team.  Re-consult for any new changes or concerns. Thank you for this consult.  Discussed recommendations with Dr. Esvin Fountain at time of consult completion.    TelePsych recommendations:Inpatient psychiatric admission          Safety Plan:  See below    Electronically signed by Kerry Christiansen LCSW on 5/26/2025 at 9:31 PM.    Elvira Reid, was evaluated through a synchronous (real-time) audio-video encounter. The patient (and/or guardian if applicable) is aware that this is a billable service, which includes applicable co-pays. This virtual visit was conducted with patient's (and/or legal guardian's) consent. Patient identification was verified, and a caregiver was present when appropriate.  The patient was located at Facility (Appt Department): [facility] EMERGENCY DEPARTMENT  [address] [place] [address]  Loc: [phone]  The provider was located at Facility (Login Dept): [facility] TELEPSYCHIATRY PROGRAM  [address] [facility]  C/O VIRTUAL HEALTH TELEPSYCH  [facility] [id number] [phone]  Confirm you are appropriately licensed, registered, or certified to deliver care in the state where the patient is located as indicated above. If you are not or unsure, please re-schedule the visit: Yes, I confirm.   [place] Consult to Tele-Psych  Consult performed by: Kerry Christiansen LCSW  Consult ordered by: Esvin Fountain MD  Reason for consult: Psychosis         Total time spent on this

## 2025-05-27 NOTE — BH NOTE
PRN Medication Documentation    Specific patient behavior that led to need for PRN medication: c/o anxiety  Staff interventions attempted prior to PRN being given: coping skill  PRN medication given: atarax  Patient response/effectiveness of PRN medication: autumn aware

## 2025-05-27 NOTE — BSMART NOTE
Patient asleep on the stretcher. Per staff there have been no behavioral issues while in the ED. Patient's nurse notified that she will get a bed assignment around 1:30 once there is a discharge upstairs.

## 2025-05-27 NOTE — ED NOTES
TRANSFER - OUT REPORT:    Verbal report given to SHAQUILLE Grayson on Elvira Reid  being transferred to U for routine progression of patient care       Report consisted of patient's Situation, Background, Assessment and   Recommendations(SBAR).     Information from the following report(s) Nurse Handoff Report, ED Encounter Summary, ED SBAR, MAR, Recent Results, Med Rec Status, and Neuro Assessment was reviewed with the receiving nurse.    [place] Fall Assessment:    Presents to emergency department  because of falls (Syncope, seizure, or loss of consciousness): No  Age > 70: No  Altered Mental Status, Intoxication with alcohol or substance confusion (Disorientation, impaired judgment, poor safety awaremess, or inability to follow instructions): No  Impaired Mobility: Ambulates or transfers with assistive devices or assistance; Unable to ambulate or transer.: Yes  Nursing Judgement: Yes          Lines:   Peripheral IV 05/26/25 Right Antecubital (Active)        Opportunity for questions and clarification was provided.      Patient transported with:  Tech, Security

## 2025-05-27 NOTE — ED TRIAGE NOTES
Patient arrives to ED reports having trouble sleeping for 1 week and feels like she is psychosis. Reports she hasn't been eating or drinking and feels like she is dehydrated.     History of bipolar and ADHD. Denies SI/HI

## 2025-05-27 NOTE — ED PROVIDER NOTES
[facility] EMERGENCY DEPARTMENT  EMERGENCY DEPARTMENT ENCOUNTER      Pt Name: Elvira Reid  MRN: [id number]  Birthdate 1975  Date of evaluation: 5/26/2025  Provider: Esvin Fountain MD    CHIEF COMPLAINT       Chief Complaint   Patient presents with    Mental Health Problem         HISTORY OF PRESENT ILLNESS   (Location/Symptom, Timing/Onset, Context/Setting, Quality, Duration, Modifying Factors, Severity)  Note limiting factors.   50F w/ etoh abuse, bipolar, liver disease and anemia p/w 1wk fatigue. Pt reports 1wk generalized weakness and fatigue. She reports very poor PO intake and feels dehydrated. Also reports difficulty sleeping. NO pain anywhere including no head/neck or chest pain. No N/V/D or F/C. No cough or SOB. No recent falls or trauma.    Also sadness and depression but no SI/HI/AVH. Chronic etoh w/ last drink \"a few weeks ago.\" No drugs. Currently living in a sober living house.             Review of External Medical Records:     Nursing Notes were reviewed.    REVIEW OF SYSTEMS    (2-9 systems for level 4, 10 or more for level 5)     Review of Systems   Constitutional:  Positive for fatigue. Negative for diaphoresis and fever.   HENT:  Negative for nosebleeds.    Eyes:  Negative for visual disturbance.   Respiratory:  Negative for cough and shortness of breath.    Cardiovascular:  Negative for chest pain, palpitations and leg swelling.   Gastrointestinal:  Negative for abdominal distention, abdominal pain, anal bleeding, blood in stool, diarrhea, nausea and vomiting.   Endocrine: Negative for polyuria.   Genitourinary:  Negative for difficulty urinating, dysuria, frequency and hematuria.   Musculoskeletal:  Negative for joint swelling.   Skin:  Negative for wound.   Allergic/Immunologic: Negative for immunocompromised state.   Neurological:  Negative for seizures and syncope.   Hematological:  Does not bruise/bleed easily.   Psychiatric/Behavioral:  Negative for confusion.        Except as

## 2025-05-28 LAB
EKG ATRIAL RATE: 76 BPM
EKG DIAGNOSIS: NORMAL
EKG P AXIS: 35 DEGREES
EKG P-R INTERVAL: 118 MS
EKG Q-T INTERVAL: 384 MS
EKG QRS DURATION: 74 MS
EKG QTC CALCULATION (BAZETT): 432 MS
EKG R AXIS: 50 DEGREES
EKG T AXIS: 54 DEGREES
EKG VENTRICULAR RATE: 76 BPM

## 2025-05-28 PROCEDURE — 1240000000 HC EMOTIONAL WELLNESS R&B

## 2025-05-28 PROCEDURE — 6370000000 HC RX 637 (ALT 250 FOR IP): Performed by: NURSE PRACTITIONER

## 2025-05-28 PROCEDURE — 6370000000 HC RX 637 (ALT 250 FOR IP): Performed by: PSYCHIATRY & NEUROLOGY

## 2025-05-28 PROCEDURE — 93010 ELECTROCARDIOGRAM REPORT: CPT | Performed by: SPECIALIST

## 2025-05-28 PROCEDURE — 93005 ELECTROCARDIOGRAM TRACING: CPT | Performed by: PSYCHIATRY & NEUROLOGY

## 2025-05-28 RX ORDER — FOLIC ACID 1 MG/1
1 TABLET ORAL DAILY
COMMUNITY

## 2025-05-28 RX ORDER — AMLODIPINE BESYLATE 5 MG/1
5 TABLET ORAL DAILY
Status: ON HOLD | COMMUNITY
End: 2025-05-29 | Stop reason: HOSPADM

## 2025-05-28 RX ORDER — M-VIT,TX,IRON,MINS/CALC/FOLIC 27MG-0.4MG
1 TABLET ORAL DAILY
COMMUNITY

## 2025-05-28 RX ORDER — THIAMINE MONONITRATE (VIT B1) 100 MG
100 TABLET ORAL DAILY
COMMUNITY

## 2025-05-28 RX ORDER — ACAMPROSATE CALCIUM 333 MG/1
333 TABLET, DELAYED RELEASE ORAL 3 TIMES DAILY
Status: DISCONTINUED | OUTPATIENT
Start: 2025-05-28 | End: 2025-05-29 | Stop reason: HOSPADM

## 2025-05-28 RX ORDER — LANOLIN ALCOHOL/MO/W.PET/CERES
100 CREAM (GRAM) TOPICAL DAILY
Status: DISCONTINUED | OUTPATIENT
Start: 2025-05-28 | End: 2025-05-29 | Stop reason: HOSPADM

## 2025-05-28 RX ORDER — FOLIC ACID 1 MG/1
1 TABLET ORAL DAILY
Status: DISCONTINUED | OUTPATIENT
Start: 2025-05-28 | End: 2025-05-29 | Stop reason: HOSPADM

## 2025-05-28 RX ORDER — FLUOXETINE HYDROCHLORIDE 40 MG/1
40 CAPSULE ORAL DAILY
Status: ON HOLD | COMMUNITY
End: 2025-05-29 | Stop reason: HOSPADM

## 2025-05-28 RX ORDER — LURASIDONE HYDROCHLORIDE 20 MG/1
20 TABLET, FILM COATED ORAL
Status: DISCONTINUED | OUTPATIENT
Start: 2025-05-28 | End: 2025-05-29

## 2025-05-28 RX ADMIN — ACAMPROSATE CALCIUM 333 MG: 333 TABLET, DELAYED RELEASE ORAL at 16:21

## 2025-05-28 RX ADMIN — CLONIDINE HYDROCHLORIDE 0.1 MG: 0.1 TABLET ORAL at 21:09

## 2025-05-28 RX ADMIN — Medication 1 MG: at 16:30

## 2025-05-28 RX ADMIN — CLONIDINE HYDROCHLORIDE 0.1 MG: 0.1 TABLET ORAL at 08:38

## 2025-05-28 RX ADMIN — Medication 100 MG: at 16:30

## 2025-05-28 RX ADMIN — LURASIDONE HYDROCHLORIDE 20 MG: 20 TABLET, FILM COATED ORAL at 16:21

## 2025-05-28 RX ADMIN — ACAMPROSATE CALCIUM 333 MG: 333 TABLET, DELAYED RELEASE ORAL at 21:09

## 2025-05-28 RX ADMIN — Medication 3 MG: at 21:09

## 2025-05-28 RX ADMIN — ACAMPROSATE CALCIUM 666 MG: 333 TABLET, DELAYED RELEASE ORAL at 08:38

## 2025-05-28 ASSESSMENT — PAIN SCALES - GENERAL: PAINLEVEL_OUTOF10: 0

## 2025-05-28 NOTE — BH NOTE
GROUP THERAPY PROGRESS NOTE    Patient did not participate in recreational therapy group.    Katherine Gillies, MSW, [facility]

## 2025-05-28 NOTE — DISCHARGE INSTRUCTIONS
DISCHARGE SUMMARY    NAME:Elvira Reid  : 1975  MRN: [id number]    The patient Elvira Reid exhibits the ability to control behavior in a less restrictive environment.  Patient's level of functioning is improving.  No assaultive/destructive behavior has been observed for the past 24 hours.  No suicidal/homicidal threat or behavior has been observed for the past 24 hours.  There is no evidence of serious medication side effects.  Patient has not been in physical or protective restraints for at least the past 24 hours.    If weapons involved, how are they secured? None    Is patient aware of and in agreement with discharge plan? Yes    Arrangements for medication:  Prescriptions filled through [facility], 30 day supply provided    Copy of discharge instructions to provider?: Yes    Arrangements for transportation home: Lyft    Keep all follow up appointments as scheduled, continue to take prescribed medications per physician instructions.  Mental health crisis number:  911 or your local mental health crisis line number at [facility] at [phone] or [facility] at [phone]      Mental Health Emergency WARM LINE      [phone]-[facility] ([phone])      M-F: 9am to 9pm      Sat & Sun: 5pm - 9pm  National suicide prevention lines:                             2-944-GBPYDRO (1-898.168.2332)       0-304-707-TALK ([phone])    Crisis Text Line:  Text HOME to 649823

## 2025-05-28 NOTE — CARE COORDINATION
05/28/25 1151   Suicidal Ideation   Wish to be Dead Lifetime - No;Past 1 month - No   Non-Specific Active Suicidal Thoughts Lifetime - No;Past 1 month - No

## 2025-05-28 NOTE — INTERDISCIPLINARY ROUNDS
Behavioral Health Interdisciplinary Rounds     Patient Name: Elvira Reid  Age: 50 y.o.  Room/Bed:  [facility]  Primary Diagnosis: Bipolar 1 disorder (HCC)   Admission Status:      Readmission within 30 days:   Power of  in place:   Patient requires a blocked bed: no          Reason for blocked bed:   Sleep hours:   Flu vaccine :no       Participation in Care/Groups:  yes  Medication Compliant?: yes  PRNS (last 24 hours): anxiety    Restraints (last 24 hours):  no  __________________________________________________  OQ Admission Analysis Survey completed:  OQ Admission Analysis Survey score:    __________________________________________________     Alcohol screening (AUDIT) completed -     Score:   Tobacco :  Illegal Drugs use:   CSSR Lifetime:     24 hour chart check complete: yes     _______________________________________________    Patient goal(s) for today:   Treatment team focus/goals:   Progress note:      Spiritual Care Consult:   Financial concerns/prescription coverage:    Family contact:                        Family requesting physician contact today:    Discharge plan:   Access to weapons :                                                              Outpatient provider(s):     LOS:  1  Expected LOS:     Participating treatment team members: Elvira Reid, * (assigned SW),

## 2025-05-28 NOTE — PROGRESS NOTES
Admission Medication Reconciliation:    Information obtained from:  patient interview, Insurance claims data, review of EMR, and [facility]  RxQuery data available¹:  YES    Comments/Recommendations: Updated PTA meds/reviewed patient's allergies.    1)  The patient confirms her medications during treatment team. She confirms that she is still taking fluoxetine 50 mg (10 mg + 40 mg), acamprosate 333 mg TID, disulfiram 250 mg; although it appears she has not filled acamprosate or disulifram in the last few months. She reports that she had been diagnosed with bipolar earlier in life but never prescribed medications until 2014. She feels that quetiapine 200 mg is \"not enough for my bipolar\" but 400 mg made her \"not want to do anything.\" She is open to changing medications. Other medication history includes atomoxetine (did not work), lithium (did  not like), and escitalopram. She reports taking Vyvanse 60 mg/day for ~2.5 years.    2)  The [place] Prescription Monitoring Program () was assessed to determine fill history of any controlled medications. The patient has been filling Vyvanse off and on over the last 12 years. Most recent fills are listed below:  - 5/15/25: Vyvanse 60 mg, #30 for 30 day supply  - 4/10/25: Vyvanse 60 mg, #30 for 30 day supply  - 3/12/25: Vyvanse 60 mg, #30 for 30 day supply  - 11/24/24: Vyvanse 30 mg, #60 for 30 day supply    3)  Medication changes (since last review):  Added  - amlodipine 5 mg daily  - folic acid 1 mg daily  - multivitamin daily   - disulfiram 250 mg daily     Adjusted  - acamprosate 333 mg TID (from 666 mg)  - clonidine 0.2 mg BID (from TID PRN)  - fluoxetine 10 mg + 40 mg daily (added 40 mg)  - hydroxyzine 50 mg BID PRN anxiety (from itching) - patient reports taking it QHS    Removed  - atomoxetine, lisinopril   ¹RxQuery pharmacy benefit data reflects medications filled and processed through the patient's insurance, however this data does NOT capture whether the medication 
NUTRITION     Nutrition screening referral was triggered based on results obtained during nursing admission assessment for weight loss and eating poorly due to decreased appetite.     Weight history:   7/8/24  64.7 kg   4/28/25 63.5 kg   5/3/25  65.8 kg   5/26/25 65.1 kg     The patient's chart was reviewed and nutrition assessment is not indicated at this time.  Please re - consult with further nutrition related concerns.  Thank you.     Deisi Singh RD    
AST 31 05/26/2025 09:23 PM       Glucose/Hemoglobin A1c  No results found for: \"GLU\", \"GLUCPOC\"  No results found for: \"LABA1C\", \"ZKY6IAIX\", \"EAG\"    Hematology  Lab Results   Component Value Date/Time    WBC 8.0 05/26/2025 09:23 PM    RBC 3.79 05/26/2025 09:23 PM    HGB 11.4 05/26/2025 09:23 PM    HCT 34.4 05/26/2025 09:23 PM    MCV 90.8 05/26/2025 09:23 PM    MCH 30.1 05/26/2025 09:23 PM    MCHC 33.1 05/26/2025 09:23 PM    RDW 14.2 05/26/2025 09:23 PM     05/26/2025 09:23 PM       Lipids  No results found for: \"CHOL\", \"TRIG\", \"HDL\", \"CHOLHDLRATIO\"    Thyroid Function  No results found for: \"TSH\", \"TSH2\", \"TSH3\", \"TSHELE\", \"T3RIA\", \"T3UP\", \"FT3\", \"FT4\", \"T4\", \"TT7\"    Pregnancy Status  Lab Results   Component Value Date/Time    PREGTESTUR Negative 05/27/2025 06:37 AM    PREGTESTUR Negative 04/25/2022 03:30 PM       Assessment/Plan:  Will order lipid panel and hemoglobin A1c or fasting glucose to complete the recommended baseline laboratory monitoring based on the patient's current medication regimen.        Anastasia Mcdonald RP

## 2025-05-28 NOTE — BH NOTE
PSYCHOSOCIAL ASSESSMENT  :Patient identifying info:   Elvira Reid is a 50 y.o., female admitted 5/26/2025  9:19 PM     Presenting problem and precipitating factors: 50 year old female admitted from [facility] ED endorsing increased agitation, poor sleep, and disorganization. She reports she is not able to gather her thoughts, endorsing passive SI. She denies HI and AHVH.    Mental status assessment: AOX4, fair mood and affect, fair historian, fair insight, fair judgement, speech WNL, fair eye contact    Strengths/Recreation/Coping Skills: Voluntary, insured, stable housing, motivated to engage in treatment and continue with sobriety    Collateral information: None    Current psychiatric /substance abuse providers and contact info: [facility] for [facility]ry    Previous psychiatric/substance abuse providers and response to treatment: 10 admissions within the past few years    Family history of mental illness or substance abuse: Yes    Substance abuse history:  UDS+ Amphetamine, BAL 0  Social History     Tobacco Use    Smoking status: Never    Smokeless tobacco: Never   Substance Use Topics    Alcohol use: Yes       History of biomedical complications associated with substance abuse: None    Patient's current acceptance of treatment or motivation for change: Voluntary    Family constellation: Patient is single, no children    Is significant other involved? No    Describe support system: Fair family support, fair outpatient community support    Describe living arrangements and home environment: Lives in a sober living home; [facility] [address] [place] [address] ([place])  [place] [address]     GUARDIAN/POA: No    Guardian Name: None    Guardian Contact: None    Health issues:   Past Medical History:   Diagnosis Date    Anemia     Anxiety     Liver disease     Seizure (HCC)      Trauma history: Yes    Legal issues: No pending legal charges, on probation    History of  service: None    Financial status:

## 2025-05-28 NOTE — CARE COORDINATION
05/28/25 0910   ITP   Date of Plan 05/28/25   Date of Next Review 06/04/25   Primary Diagnosis Code Bipolar I   Barriers to Treatment Need for psychoeducation   Strengths Incorporated in Plan Acknowledging need for assistance;Community supports   Plan of Care   Long Term Goal (LTG) Stated in patient/guardian terms Participate in out pt tx 12 wks   Short Term Goal 1   Short Term Goal 1 Client will remain safe during stay   Baseline Functioning passive SI   Target Maintain safety   Objectives Client will participate in group therapy   Intervention  Assess safety   Frequency daily   Measured by Staff observation;Self report;Behavioral data   Staff Responsible Clinical staff;[facility] staff   Intervention 2 Acknowledge client strengths   Frequency daily   Measured by Staff observation;Self report;Behavioral data   Staff Responsible Clinical staff;[facility] staff   Intervention 3 Group therapy   Frequency daily   Measured by Staff observation;Self report;Behavioral data   Staff Responsible Clinical staff;[facility] staff   STG Goal 1 Status: Patient Appears to be  Progressing toward treatment plan goal   Short Term Goal 2   Short Term Goal 2 Client will maintain compliance with medication regime   Baseline Functioning ineffective or non compliant medications   Target medication adjustment and compliance   Intervention  Monitor medications   Frequency daily   Measured by Staff observation;Self report;Behavioral data   Staff Responsible Clinical staff;[facility] staff   Intervention 2 Crisis support   Frequency daily   Measured by Staff observation;Self report;Behavioral data   Staff Responsible Clinical staff;[facility] staff   Intervention 3 Milieu therapy and support   Frequency daily   Measured by Staff observation;Self report;Behavioral data   Staff Responsible Clinical staff;[facility] staff   STG Goal 2 Status: Patient Appears to be  Progressing toward treatment plan goal   Crisis/Safety/Discharge Plan   Crisis/Safety Plan Standard program

## 2025-05-28 NOTE — BH NOTE
GROUP THERAPY PROGRESS NOTE    Patient did not participate in psychoeducation group.    Katherine Gillies MSW, [facility]

## 2025-05-28 NOTE — BH NOTE
GROUP THERAPY PROGRESS NOTE    Patient did not participate in Expressions group.    Katherine Gillies MSW, [facility]

## 2025-05-28 NOTE — CARE COORDINATION
05/28/25 0910   ITP   Date of Plan 05/28/25   Date of Next Review 06/04/25   Primary Diagnosis Code Bipolar I   Barriers to Treatment Need for psychoeducation   Strengths Incorporated in Plan Acknowledging need for assistance;Community supports   Plan of Care   Long Term Goal (LTG) Stated in patient/guardian terms Participate in out pt tx 12 wks   Short Term Goal 1   Short Term Goal 1 Client will remain safe during stay   Baseline Functioning passive SI   Target Maintain safety   Objectives Client will participate in group therapy   Intervention  Assess safety   Frequency daily   Measured by Staff observation;Self report;Behavioral data   Staff Responsible Clinical staff;[facility] staff   Intervention 2 Acknowledge client strengths   Frequency daily   Measured by Staff observation;Self report;Behavioral data   Staff Responsible Clinical staff;[facility] staff   Intervention 3 Group therapy   Frequency daily   Measured by Staff observation;Self report;Behavioral data   Staff Responsible Clinical staff;[facility] staff   STG Goal 1 Status: Patient Appears to be  Progressing toward treatment plan goal   Short Term Goal 2   Short Term Goal 2 Client will maintain compliance with medication regime   Baseline Functioning ineffective or non compliant medications   Target medication adjustment and compliance   Intervention  Monitor medications   Frequency daily   Measured by Staff observation;Self report;Behavioral data   Staff Responsible Clinical staff;[facility] staff   Intervention 2 Crisis support   Frequency daily   Measured by Staff observation;Self report;Behavioral data   Staff Responsible Clinical staff;[facility] staff   Intervention 3 Milieu therapy and support   Frequency daily   Measured by Staff observation;Self report;Behavioral data   Staff Responsible Clinical staff;[facility] staff   STG Goal 2 Status: Patient Appears to be  Progressing toward treatment plan goal   Crisis/Safety/Discharge Plan   Crisis/Safety Plan Standard program

## 2025-05-28 NOTE — H&P
Department of Psychiatry  History and Physical - Adult         CHIEF COMPLAINT:  \"I thrive on a schedule\"    History obtained from:  patient, electronic medical record    HISTORY OF PRESENT ILLNESS:          The patient is a 50 y.o. female who presents on a voluntary basis with poor sleep and initially confusion. She reports that she lives in a sober living program and does very well during the week when she is on a strict schedule. She struggles on the weekend when there is more free time. She stayed up for several says and came into the ED with confusion and lack of sleep. She has been prescribed seroquel 200mg for bipolar disorder but does not feel it is effective and cannot tolerate higher doses. She did try taking 400mg the night before she came to the hospital because she was trying to get some sleep. She denies any SI or thoughts of self harm. She reports that she has been in and out of treatment facilities since she lost her apartment in January of 2023. She feels that her outpatient psychiatric provider has not been listening to her and that her medications need to be adjusted.    PSYCHIATRIC HISTORY:      The patient is currently receiving care for the above psychiatric illness.    Past mental health outpatient care includes:  1-5 treatment centers    Past mental health hospitalizations: 1-5 admissions    Past Medical History:        Diagnosis Date    Anemia     Anxiety     Liver disease     Seizure (HCC)      Past Surgical History:        Procedure Laterality Date    OTHER SURGICAL HISTORY      left arm broken    US I&D BREAST ABSCESS DEEP N/A 5/6/2016     I&D BREAST ABSCESS DEEP     Medications Prior to Admission:   Medications Prior to Admission: FLUoxetine (PROZAC) 40 MG capsule, Take 1 capsule by mouth daily (Take with 10 mg for total of 50 mg)  amLODIPine (NORVASC) 5 MG tablet, Take 1 tablet by mouth daily  folic acid (FOLVITE) 1 MG tablet, Take 1 tablet by mouth daily  vitamin B-1 (THIAMINE) 100

## 2025-05-28 NOTE — INTERDISCIPLINARY ROUNDS
Behavioral Health Interdisciplinary Rounds     Patient Name: Elvira Reid  Age: 50 y.o.  Room/Bed:  729/  Primary Diagnosis: Bipolar 1 disorder (HCC)   Admission Status:      Readmission within 30 days:   Power of  in place:   Patient requires a blocked bed: no          Reason for blocked bed:   Sleep hours:   Flu vaccine :no       Participation in Care/Groups:  yes  Medication Compliant?: yes  PRNS (last 24 hours): anxiety    Restraints (last 24 hours):  no  __________________________________________________  OQ Admission Analysis Survey completed:  OQ Admission Analysis Survey score:    __________________________________________________     Alcohol screening (AUDIT) completed -     Score:   Tobacco :  Illegal Drugs use:   CSSR Lifetime:     24 hour chart check complete: yes     _______________________________________________    Patient goal(s) for today:   Treatment team focus/goals:   Progress note: Patient met with treatment team for the first time since admission and appeared with a fair and somewhat elevated mood and affect. She reports working well with routine in her life and has been doing well with the [facility] and [facility], but with the holiday weekend she had a difficult weekend and slept poorly. She denies SI/HI and AHVH, reports racing thoughts and difficulty focusing. Plan to adjust medications, SW to contact [facility] to ensure spot is reserved.    1500: SW left message for VFS to confirm bed hold.    LOS:  1  Expected LOS: 5    Participating treatment team members: Elvira Reid, Shavonne Frey, MSW, Lesa DOYLE RN, Nirmala Tijerina NP, Dalila DuffD

## 2025-05-29 VITALS
SYSTOLIC BLOOD PRESSURE: 104 MMHG | TEMPERATURE: 98.4 F | BODY MASS INDEX: 22.53 KG/M2 | HEIGHT: 67 IN | HEART RATE: 69 BPM | OXYGEN SATURATION: 100 % | RESPIRATION RATE: 16 BRPM | DIASTOLIC BLOOD PRESSURE: 77 MMHG | WEIGHT: 143.52 LBS

## 2025-05-29 PROCEDURE — 6370000000 HC RX 637 (ALT 250 FOR IP): Performed by: NURSE PRACTITIONER

## 2025-05-29 PROCEDURE — 6370000000 HC RX 637 (ALT 250 FOR IP): Performed by: PSYCHIATRY & NEUROLOGY

## 2025-05-29 RX ORDER — CLONIDINE HYDROCHLORIDE 0.2 MG/1
0.2 TABLET ORAL 2 TIMES DAILY
Status: DISCONTINUED | OUTPATIENT
Start: 2025-05-29 | End: 2025-05-29 | Stop reason: HOSPADM

## 2025-05-29 RX ORDER — LURASIDONE HYDROCHLORIDE 40 MG/1
40 TABLET, FILM COATED ORAL
Qty: 30 TABLET | Refills: 0 | Status: SHIPPED | OUTPATIENT
Start: 2025-05-29

## 2025-05-29 RX ORDER — CLONIDINE HYDROCHLORIDE 0.1 MG/1
0.1 TABLET ORAL ONCE
Status: COMPLETED | OUTPATIENT
Start: 2025-05-29 | End: 2025-05-29

## 2025-05-29 RX ORDER — LURASIDONE HYDROCHLORIDE 40 MG/1
40 TABLET, FILM COATED ORAL
Status: DISCONTINUED | OUTPATIENT
Start: 2025-05-29 | End: 2025-05-29 | Stop reason: HOSPADM

## 2025-05-29 RX ADMIN — CLONIDINE HYDROCHLORIDE 0.1 MG: 0.1 TABLET ORAL at 12:13

## 2025-05-29 RX ADMIN — HYDROXYZINE HYDROCHLORIDE 50 MG: 50 TABLET ORAL at 03:25

## 2025-05-29 RX ADMIN — HYDROXYZINE HYDROCHLORIDE 50 MG: 50 TABLET ORAL at 09:46

## 2025-05-29 RX ADMIN — Medication 100 MG: at 09:06

## 2025-05-29 RX ADMIN — Medication 1 MG: at 09:06

## 2025-05-29 RX ADMIN — ACAMPROSATE CALCIUM 333 MG: 333 TABLET, DELAYED RELEASE ORAL at 13:43

## 2025-05-29 RX ADMIN — ACAMPROSATE CALCIUM 333 MG: 333 TABLET, DELAYED RELEASE ORAL at 09:06

## 2025-05-29 RX ADMIN — CLONIDINE HYDROCHLORIDE 0.1 MG: 0.1 TABLET ORAL at 09:07

## 2025-05-29 NOTE — DISCHARGE SUMMARY
DISCHARGE SUMMARY    Some parts of the discharge summary are from the initial Psychiatric interview that was done on admission by the admitting psychiatrist.     Date of Admission: 5/26/2025    Date of Discharge: 5/29/2025     TYPE OF DISCHARGE:   REGULAR -  YES      ADMISSION EVALUATION:  The patient is a 50 y.o. female who presents on a voluntary basis with poor sleep and initially confusion. She reports that she lives in a sober living program and does very well during the week when she is on a strict schedule. She struggles on the weekend when there is more free time. She stayed up for several says and came into the ED with confusion and lack of sleep. She has been prescribed seroquel 200mg for bipolar disorder but does not feel it is effective and cannot tolerate higher doses. She did try taking 400mg the night before she came to the hospital because she was trying to get some sleep. She denies any SI or thoughts of self harm. She reports that she has been in and out of treatment facilities since she lost her apartment in January of 2023. She feels that her outpatient psychiatric provider has not been listening to her and that her medications need to be adjusted.         COURSE IN THE HOSPITAL:    Patient was admitted to the inpatient psychiatry unit for acute psychiatric stabilization in regards to symptomatology as described in the HPI above and placed on Q15 minute checks and withdrawal precautions. While on the unit Elvira Reid was involved in individual, group, occupational and milieu therapy. She was started back on her usual medication regimen as well as PRN medications including  hydroxyzine. She improved gradually and was able to integrate into the milieu with help from the nursing staff. Patients symptoms improved gradually including depressed mood. She was quite on the unit, appropriate in her interactions, and cooperative with medications and the unit routine. Please see individual progress notes  Last visit was 1/16/18

## 2025-05-29 NOTE — BH NOTE
PRN Medication Documentation    Specific patient behavior that led to need for PRN medication: pt c/o anxiety  Staff interventions attempted prior to PRN being given: scheduled medications, other prn's  PRN medication given: atarax 50 mg   Patient response/effectiveness of PRN medication:

## 2025-05-29 NOTE — INTERDISCIPLINARY ROUNDS
Interdisciplinary Rounds Note     Patient goal(s) for today: Communicate needs to staff   Treatment team focus/goals: Assess pt, manage medications, discharge planning     Progress note: Patient met with treatment team and appeared with a fair mood and affect. She denies SI/HI and AHVH, reports sleep is caught up on sleep. She feels her medications are helpful. Plan to discharge tomorrow. SW to attempt to contact [facility] again.    1530: SW spoke with S, states patients belongings have been packed up from her sober living home and placed in the main office in the [facility] area for safe keeping. Staff shared that her insurance denied further services during her last authorization and she cannot return to the program. SW inquired about the typical discharge process for this agency as patient does not have any follow up resources, they recommended [facility] or [facility] for a 1.0 level of care.    1540: SW spoke with patient to inform of program update, she became upset and was insisting \"someone is wrong\" and \"I knew this would happen I should have never come here\", she attempted to contact her counselor from the program while SW contacted previous staff person again and asked if patient could speak to a supervisor. During this time, patient demanding to leave the hospital and repeatedly stated \"I'm here voluntarily, you have to let me go!\" SW reassured that patient could be released and would contact provider for discharge order.    Patient spoke with supervisor who informed her belongings were still at the house, however, that insurance was no longer covering services and she wouldn't be able to continue program. Patient stated several times throughout conversation \"I'm appealing [employer] with my counselor! [employer] has been trying to step me down for forever now but it's not right!\"    Patient then contacted  who stated belongings were in fact packed and being stored in main office.  stated he

## 2025-05-29 NOTE — BH NOTE
GROUP THERAPY PROGRESS NOTE    Patient did not participate in Healthy Living group.    Katherine Gillies, MSW, [facility]

## 2025-05-29 NOTE — BH NOTE
PRN Medication Documentation    Specific patient behavior that led to need for PRN medication: restless, anxious  Staff interventions attempted prior to PRN being given: distraction  PRN medication given: 50 mg Atarax PO  Patient response/effectiveness of PRN medication:

## 2025-05-29 NOTE — BH NOTE
GROUP THERAPY PROGRESS NOTE    Patient did not participate in psychoeducation group. Handouts provided.    Katherine Gillies MSW, [facility]

## 2025-05-29 NOTE — PLAN OF CARE
Problem: Anxiety  Goal: Will report anxiety at manageable levels  Description: INTERVENTIONS:1. Administer medication as ordered2. Teach and rehearse alternative coping skills3. Provide emotional support with 1:1 interaction with staff  Outcome: Progressing     Problem: Depression  Goal: Will be euthymic at discharge  Description: INTERVENTIONS:1. Administer medication as ordered2. Provide emotional support via 1:1 interaction with staff3. Encourage involvement in milieu/groups/activities4. Monitor for social isolation  5/29/2025 1532 by Alma Rosa Muir, RN  Outcome: Progressing   Pt out on unit engaged with peers. Denies current SI/HI/AVH. Remains medication and meal compliant. Will continue to monitor q15 minutes for safety.   
  Problem: Behavior  Goal: Pt/Family maintain appropriate behavior and adhere to behavioral management agreement, if implemented  Description: INTERVENTIONS:1. Assess patient/family's coping skills and  non-compliant behavior (including use of illegal substances)2. Notify security of behavior or suspected illegal substances which indicate the need for search of the family and/or belongings3. Encourage verbalization of thoughts and concerns in a socially appropriate manner4. Utilize positive, consistent limit setting strategies supporting safety of patient, staff and others5. Encourage participation in the decision making process about the behavioral management agreement6. If a visitor's behavior poses a threat to safety call refer to organization policy.7. Initiate consult with , Psychosocial CNS, Spiritual Care as appropriate  5/28/2025 0015 by Bam Wilson, RN  Outcome: Progressing     Problem: Anxiety  Goal: Will report anxiety at manageable levels  Description: INTERVENTIONS:1. Administer medication as ordered2. Teach and rehearse alternative coping skills3. Provide emotional support with 1:1 interaction with staff  5/28/2025 0015 by Bam Wilson, RN  Outcome: Progressing   Patient resting in bed with eyes closed, appears to be sleeping, respirations even and unlabored.   
  Problem: Depression  Goal: Will be euthymic at discharge  Description: INTERVENTIONS:1. Administer medication as ordered2. Provide emotional support via 1:1 interaction with staff3. Encourage involvement in milieu/groups/activities4. Monitor for social isolation  Outcome: Progressing  Note: Out on unit watching tv and appropriately sharing common spaces with peers. Appearing anxious at times. Received prn atarax per order this am. Demonstrating appropriate behaviors, ability to interact w peers and get her needs met. Daily goal is to discuss d/c plans and follow up care. Staff focus is on offering support     
  Problem: Discharge Planning  Goal: Discharge to home or other facility with appropriate resources  Outcome: Adequate for Discharge     Problem: Anxiety  Goal: Will report anxiety at manageable levels  Description: INTERVENTIONS:1. Administer medication as ordered2. Teach and rehearse alternative coping skills3. Provide emotional support with 1:1 interaction with staff  5/29/2025 1612 by Beth Aquino RN  Outcome: Adequate for Discharge  5/29/2025 1532 by Alma Rosa Muir RN  Outcome: Progressing     Problem: Depression  Goal: Will be euthymic at discharge  Description: INTERVENTIONS:1. Administer medication as ordered2. Provide emotional support via 1:1 interaction with staff3. Encourage involvement in milieu/groups/activities4. Monitor for social isolation  5/29/2025 1612 by Beth Aquino RN  Outcome: Adequate for Discharge  5/29/2025 1532 by Alma Rosa Muir RN  Outcome: Progressing  5/29/2025 1052 by Deisi Herr RN  Outcome: Progressing  Note: Out on unit watching tv and appropriately sharing common spaces with peers. Appearing anxious at times. Received prn atarax per order this am. Demonstrating appropriate behaviors, ability to interact w peers and get her needs met. Daily goal is to discuss d/c plans and follow up care. Staff focus is on offering support     Problem: Safety - Adult  Goal: Free from fall injury  Outcome: Adequate for Discharge     
  Problem: Pain  Goal: Verbalizes/displays adequate comfort level or baseline comfort level  Outcome: Progressing     Problem: Elsie  Goal: Will exhibit normal sleep and speech and no impulsivity  Description: INTERVENTIONS:1. Administer medication as ordered2. Set limits on impulsive behavior3. Make attempts to decrease external stimuli as possible  Outcome: Progressing     Problem: Anxiety  Goal: Will report anxiety at manageable levels  Description: INTERVENTIONS:1. Administer medication as ordered2. Teach and rehearse alternative coping skills3. Provide emotional support with 1:1 interaction with staff  5/28/2025 0015 by Bam Wilson, RN  Outcome: Progressing    1140: Patient is participatory in treatment team. Mood and affect; cooperative and pleasant, but anxious and slightly hyperverbal, but has good insight into situation. Denies SI/HI. Denies AH/VH. Medication and meal compliant. Plans are ongoing, patient voices no additional concerns at this time.      
  Problem: Safety - Adult  Goal: Free from fall injury  Outcome: Progressing   Pt laying in bed with eyes closed, appears to be sleeping. Respirations even and unlabored, NAD. Safety measures in place. Q15 min safety checks continued.       Pt declined to do labs stating that she is dehydrated. Pt understood to hydrate today. Labs re-timed tomorrow.   
Services and was able to get in contact with them to let them know she was here and verified she would not lose her spot at her current living facility.    Pt is very friendly and cooperative. Exhibits pressured speech, somewhat tangential in conversation. Does possess insight into her condition and current state of mind. Able to make her needs known to staff. Tearful and slightly labile on the phone.    Pt has a long hx of psych admissions, the last being around a year ago. Pt states she is diagnosed with Bipolar I, ADHD, PTSD and Generalized Anxiety. She provided a list of her current medications that were last taken yesterday - these were confirmed with the [facility] on [address]

## 2025-05-29 NOTE — BH NOTE
Psychiatric Progress Note    Patient: Elvira Reid MRN: [id number]  SSN: [id number]    YOB: 1975  Age: 50 y.o.  Sex: female      Admit Date: 5/26/2025    LOS: 2 days     Subjective:     Elvira Reid  reports feeling great and moods are great.  Denies SI/HI/AH/VH.  No aggression or violence.  Appropriately interactive and aware. Tolerating medications well.  Eating well and sleeping well. She is becoming discharge focused. She is pleasant and cooperative with the treatment team.    Objective:     Vitals:    05/28/25 2109 05/29/25 0802 05/29/25 0907 05/29/25 1116   BP: 117/70 (!) 124/100 (!) 124/100 (!) 118/90   Pulse:  73  73   Resp:  16  16   Temp:  97.9 °F (36.6 °C)  97.9 °F (36.6 °C)   TempSrc:  Oral  Oral   SpO2:  100%  100%   Weight:       Height:            Mental Status Exam:   Sensorium  oriented to time, place and person   Relations cooperative   Eye Contact appropriate   Appearance:  age appropriate   Speech:  normal pitch and normal volume   Thought Process: within normal limits   Thought Content no hallucinations and no delusions   Suicidal ideations none   Mood:  within normal limits   Affect:  normal   Memory   adequate   Concentration:  adequate   Insight:  fair   Judgment:  fair       MEDICATIONS:  Current Facility-Administered Medications   Medication Dose Route Frequency    lurasidone (LATUDA) tablet 20 mg  20 mg Oral Dinner    acamprosate (CAMPRAL) tablet 333 mg  333 mg Oral TID    melatonin tablet 3 mg  3 mg Oral Nightly    folic acid (FOLVITE) tablet 1 mg  1 mg Oral Daily    thiamine tablet 100 mg  100 mg Oral Daily    acetaminophen (TYLENOL) tablet 650 mg  650 mg Oral Q4H PRN    polyethylene glycol (GLYCOLAX) packet 17 g  17 g Oral Daily PRN    senna (SENOKOT) tablet 8.6 mg  1 tablet Oral Daily PRN    aluminum & magnesium hydroxide-simethicone (MAALOX PLUS) 200-200-20 MG/5ML suspension 30 mL  30 mL Oral Q6H PRN    hydrOXYzine HCl (ATARAX) tablet 50 mg  50 mg Oral TID

## 2025-05-29 NOTE — BH NOTE
GROUP THERAPY PROGRESS NOTE    Patient did not participate in recreational therapy group.    Katherine Gillies, MSW, [facility]

## 2025-05-29 NOTE — BH NOTE
Behavioral Health Transition Record    Patient Name: Elvira Reid  YOB: 1975   Medical Record Number: [id number]  Date of Admission: 5/26/2025  9:19 PM   Date of Discharge: 5/29/2025    Attending Provider: No att. providers found   Discharging Provider: Nirmala Tijerina NP    To contact this individual call [phone] and ask the  to page.  If unavailable, ask to be transferred to Behavioral Health Provider on call.  A Behavioral Health Provider will be available on call 24/7 and during holidays.    Primary Care Provider: No primary care provider on file.    No Known Allergies    Reason for Admission: ADMISSION EVALUATION:  The patient is a 50 y.o. female who presents on a voluntary basis with poor sleep and initially confusion. She reports that she lives in a sober living program and does very well during the week when she is on a strict schedule. She struggles on the weekend when there is more free time. She stayed up for several says and came into the ED with confusion and lack of sleep. She has been prescribed seroquel 200mg for bipolar disorder but does not feel it is effective and cannot tolerate higher doses. She did try taking 400mg the night before she came to the hospital because she was trying to get some sleep. She denies any SI or thoughts of self harm. She reports that she has been in and out of treatment facilities since she lost her apartment in January of 2023. She feels that her outpatient psychiatric provider has not been listening to her and that her medications need to be adjusted.    Admission Diagnosis: Bipolar 1 disorder (HCC) [F31.9]  Depression, unspecified depression type [F32.A]    * No surgery found *    Results for orders placed or performed during the hospital encounter of 05/26/25   Urine Culture Hold Sample    Specimen: Urine   Result Value Ref Range    Specimen HOld        Urine on hold in Microbiology dept for 2 days.  If unpreserved urine is submitted, it

## 2025-06-06 ENCOUNTER — HOSPITAL ENCOUNTER (EMERGENCY)
Facility: HOSPITAL | Age: 50
Discharge: HOME | End: 2025-06-06
Attending: EMERGENCY MEDICINE
Payer: COMMERCIAL

## 2025-06-06 VITALS
SYSTOLIC BLOOD PRESSURE: 158 MMHG | BODY MASS INDEX: 21.03 KG/M2 | RESPIRATION RATE: 16 BRPM | WEIGHT: 134.26 LBS | OXYGEN SATURATION: 97 % | DIASTOLIC BLOOD PRESSURE: 101 MMHG | TEMPERATURE: 98.4 F | HEART RATE: 88 BPM

## 2025-06-06 DIAGNOSIS — R21 RASH: Primary | ICD-10-CM

## 2025-06-06 PROCEDURE — 99283 EMERGENCY DEPT VISIT LOW MDM: CPT

## 2025-06-06 RX ORDER — TRIAMCINOLONE ACETONIDE 1 MG/ML
LOTION TOPICAL
Qty: 60 ML | Refills: 0 | Status: SHIPPED | OUTPATIENT
Start: 2025-06-06 | End: 2025-06-13

## 2025-06-06 ASSESSMENT — PAIN - FUNCTIONAL ASSESSMENT: PAIN_FUNCTIONAL_ASSESSMENT: NONE - DENIES PAIN

## 2025-06-06 NOTE — DISCHARGE INSTRUCTIONS
Use Kenalog lotion at least once, up to 2 times daily for up to 2 weeks.  Please call your psychiatrist to discuss staying on or switching your Latuda.  Return to the emergency department for new or worsening rash, itching, fevers.  Will provide you with a dermatology referral to use as needed.

## 2025-06-06 NOTE — ED PROVIDER NOTES
All EKG's are interpreted by the Emergency Department Physician who either signs or Co-signs this chart in the absence of a cardiologist.        RADIOLOGY:   Non-plain film images such as CT, Ultrasound and MRI are read by the radiologist. Plain radiographic images are visualized and preliminarily interpreted by the emergency physician with the below findings:        Interpretation per the Radiologist below, if available at the time of this note:    No orders to display        LABS:  Labs Reviewed - No data to display    All other labs were within normal range or not returned as of this dictation.    EMERGENCY DEPARTMENT COURSE and DIFFERENTIAL DIAGNOSIS/MDM:   Vitals:    Vitals:    06/06/25 1157   BP: (!) 158/101   Pulse: 88   Resp: 16   Temp: 98.4 °F (36.9 °C)   TempSrc: Oral   SpO2: 97%   Weight: 60.9 kg (134 lb 4.2 oz)           Medical Decision Making  50-year-old female presenting to the emergency department for a rash x 1 week.  She describes it as pruritic.  Refractory to calamine lotion and Epsom salt baths.  She believes it may be related to her Latuda which she started about 2 weeks ago during her psychiatric admission.  She states the Latuda is otherwise working well for her and denies any SI or HI.  She has not yet attempted to reach out to her psychiatrist.  In the ED she is well-appearing.  She has scattered erythematous and blanchable papules on her mid and upper abdomen. Not petechial in nature.  No bullae or skin breakdown to suggest SJS or TENS.  Rash is not urticarial in appearance and no concern for anaphylaxis.  Differential includes atopic dermatitis vs contact dermatitis vs drug reaction vs other benign rash.  Given recent psych admission and that the Latuda as well as working well for her from a psychiatric standpoint as well as the fairly mild rash manifestation at this time, she was encouraged to continue the Latuda for now and contact her psychiatrist today or as early as she can next  week to discuss medication management.  She was provided a prescription for Kenalog lotion and given strict return precautions if the rash is getting worse or she develops any new or worsening symptoms.    Risk  Prescription drug management.            REASSESSMENT            CONSULTS:  None    PROCEDURES:  Unless otherwise noted below, none     Procedures      FINAL IMPRESSION      1. Rash          DISPOSITION/PLAN   DISPOSITION Decision To Discharge 06/06/2025 11:59:33 AM      PATIENT REFERRED TO:  Dermatology specialist  [address] [place] [address]  Schedule an appointment as soon as possible for a visit   As needed      DISCHARGE MEDICATIONS:  New Prescriptions    TRIAMCINOLONE (KENALOG) 0.1 % LOTION    Apply topically 3 times daily.         (Please note that portions of this note were completed with a voice recognition program.  Efforts were made to edit the dictations but occasionally words are mis-transcribed.)    Desirae Dougherty PA-C (electronically signed)  Emergency Attending Physician / Physician Assistant / Nurse Practitioner            Desirae Dougherty PA-C  06/06/25 1210

## 2025-06-07 ENCOUNTER — HOSPITAL ENCOUNTER (EMERGENCY)
Facility: HOSPITAL | Age: 50
Discharge: HOME | End: 2025-06-07
Attending: EMERGENCY MEDICINE
Payer: COMMERCIAL

## 2025-06-07 ENCOUNTER — APPOINTMENT (OUTPATIENT)
Facility: HOSPITAL | Age: 50
End: 2025-06-07
Payer: COMMERCIAL

## 2025-06-07 VITALS
SYSTOLIC BLOOD PRESSURE: 151 MMHG | HEART RATE: 81 BPM | OXYGEN SATURATION: 100 % | TEMPERATURE: 97.9 F | RESPIRATION RATE: 15 BRPM | DIASTOLIC BLOOD PRESSURE: 98 MMHG

## 2025-06-07 DIAGNOSIS — F10.929 ACUTE ALCOHOLIC INTOXICATION WITH COMPLICATION: Primary | ICD-10-CM

## 2025-06-07 LAB
ALBUMIN SERPL-MCNC: 4 G/DL (ref 3.5–5)
ALBUMIN/GLOB SERPL: 1.2 (ref 1.1–2.2)
ALP SERPL-CCNC: 69 U/L (ref 45–117)
ALT SERPL-CCNC: 34 U/L (ref 12–78)
ANION GAP SERPL CALC-SCNC: 7 MMOL/L (ref 2–12)
AST SERPL-CCNC: 33 U/L (ref 15–37)
BASOPHILS # BLD: 0.07 K/UL (ref 0–0.1)
BASOPHILS NFR BLD: 0.9 % (ref 0–1)
BILIRUB SERPL-MCNC: 0.9 MG/DL (ref 0.2–1)
BUN SERPL-MCNC: 9 MG/DL (ref 6–20)
BUN/CREAT SERPL: 15 (ref 12–20)
CALCIUM SERPL-MCNC: 9.3 MG/DL (ref 8.5–10.1)
CHLORIDE SERPL-SCNC: 108 MMOL/L (ref 97–108)
CO2 SERPL-SCNC: 20 MMOL/L (ref 21–32)
COMMENT:: NORMAL
CREAT SERPL-MCNC: 0.6 MG/DL (ref 0.55–1.02)
DIFFERENTIAL METHOD BLD: NORMAL
EKG ATRIAL RATE: 79 BPM
EKG DIAGNOSIS: NORMAL
EKG P AXIS: 50 DEGREES
EKG P-R INTERVAL: 126 MS
EKG Q-T INTERVAL: 384 MS
EKG QRS DURATION: 74 MS
EKG QTC CALCULATION (BAZETT): 440 MS
EKG R AXIS: 68 DEGREES
EKG T AXIS: 63 DEGREES
EKG VENTRICULAR RATE: 79 BPM
EOSINOPHIL # BLD: 0.21 K/UL (ref 0–0.4)
EOSINOPHIL NFR BLD: 2.6 % (ref 0–7)
ERYTHROCYTE [DISTWIDTH] IN BLOOD BY AUTOMATED COUNT: 14.3 % (ref 11.5–14.5)
ETHANOL SERPL-MCNC: 80 MG/DL (ref 0–0.08)
GLOBULIN SER CALC-MCNC: 3.4 G/DL (ref 2–4)
GLUCOSE BLD STRIP.AUTO-MCNC: 81 MG/DL (ref 65–117)
GLUCOSE SERPL-MCNC: 67 MG/DL (ref 65–100)
HCT VFR BLD AUTO: 36.7 % (ref 35–47)
HGB BLD-MCNC: 12.2 G/DL (ref 11.5–16)
IMM GRANULOCYTES # BLD AUTO: 0.02 K/UL (ref 0–0.04)
IMM GRANULOCYTES NFR BLD AUTO: 0.3 % (ref 0–0.5)
LYMPHOCYTES # BLD: 1.79 K/UL (ref 0.8–3.5)
LYMPHOCYTES NFR BLD: 22.5 % (ref 12–49)
MCH RBC QN AUTO: 29.6 PG (ref 26–34)
MCHC RBC AUTO-ENTMCNC: 33.2 G/DL (ref 30–36.5)
MCV RBC AUTO: 89.1 FL (ref 80–99)
MONOCYTES # BLD: 0.68 K/UL (ref 0–1)
MONOCYTES NFR BLD: 8.6 % (ref 5–13)
NEUTS SEG # BLD: 5.18 K/UL (ref 1.8–8)
NEUTS SEG NFR BLD: 65.1 % (ref 32–75)
NRBC # BLD: 0 K/UL (ref 0–0.01)
NRBC BLD-RTO: 0 PER 100 WBC
PLATELET # BLD AUTO: 275 K/UL (ref 150–400)
PMV BLD AUTO: 10.2 FL (ref 8.9–12.9)
POTASSIUM SERPL-SCNC: 3.9 MMOL/L (ref 3.5–5.1)
PROT SERPL-MCNC: 7.4 G/DL (ref 6.4–8.2)
RBC # BLD AUTO: 4.12 M/UL (ref 3.8–5.2)
SERVICE CMNT-IMP: NORMAL
SODIUM SERPL-SCNC: 135 MMOL/L (ref 136–145)
SPECIMEN HOLD: NORMAL
TROPONIN I SERPL HS-MCNC: 4 NG/L (ref 0–51)
WBC # BLD AUTO: 8 K/UL (ref 3.6–11)

## 2025-06-07 PROCEDURE — 93005 ELECTROCARDIOGRAM TRACING: CPT | Performed by: EMERGENCY MEDICINE

## 2025-06-07 PROCEDURE — 84484 ASSAY OF TROPONIN QUANT: CPT

## 2025-06-07 PROCEDURE — 70450 CT HEAD/BRAIN W/O DYE: CPT

## 2025-06-07 PROCEDURE — 99284 EMERGENCY DEPT VISIT MOD MDM: CPT

## 2025-06-07 PROCEDURE — 85025 COMPLETE CBC W/AUTO DIFF WBC: CPT

## 2025-06-07 PROCEDURE — 80053 COMPREHEN METABOLIC PANEL: CPT

## 2025-06-07 PROCEDURE — 2580000003 HC RX 258: Performed by: EMERGENCY MEDICINE

## 2025-06-07 PROCEDURE — 82077 ASSAY SPEC XCP UR&BREATH IA: CPT

## 2025-06-07 PROCEDURE — 82962 GLUCOSE BLOOD TEST: CPT

## 2025-06-07 RX ORDER — 0.9 % SODIUM CHLORIDE 0.9 %
1000 INTRAVENOUS SOLUTION INTRAVENOUS ONCE
Status: COMPLETED | OUTPATIENT
Start: 2025-06-07 | End: 2025-06-07

## 2025-06-07 RX ADMIN — SODIUM CHLORIDE 1000 ML: 0.9 INJECTION, SOLUTION INTRAVENOUS at 01:16

## 2025-06-07 ASSESSMENT — PAIN DESCRIPTION - DESCRIPTORS
DESCRIPTORS: ACHING
DESCRIPTORS: THROBBING

## 2025-06-07 ASSESSMENT — PAIN SCALES - GENERAL
PAINLEVEL_OUTOF10: 7
PAINLEVEL_OUTOF10: 5

## 2025-06-07 ASSESSMENT — PAIN - FUNCTIONAL ASSESSMENT: PAIN_FUNCTIONAL_ASSESSMENT: ACTIVITIES ARE NOT PREVENTED

## 2025-06-07 ASSESSMENT — PAIN DESCRIPTION - LOCATION
LOCATION: HEAD
LOCATION: HEAD

## 2025-06-07 ASSESSMENT — PAIN DESCRIPTION - ORIENTATION
ORIENTATION: LEFT
ORIENTATION: LEFT

## 2025-06-07 ASSESSMENT — PAIN DESCRIPTION - PAIN TYPE: TYPE: ACUTE PAIN

## 2025-06-07 NOTE — ED TRIAGE NOTES
Pt arrived via EMS from [place] restaurant reported unconscious. Pt is presently A&OX4, states she had two margaritas at the bar but doesn't remember finishing them. Denies falling or hitting head. No evidence of face trauma or bodily injuries. 7/10 Left sided head pain.Takes clonidine.    /70   Pulse 81   Temp 98.4 °F (36.9 °C) (Oral)   Resp 16   SpO2 98%

## 2025-06-07 NOTE — ED NOTES
Cab ride set up for the patient to go to [address] [facility] [id number] per Patient request.
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
## 2025-06-09 ENCOUNTER — HOSPITAL ENCOUNTER (EMERGENCY)
Facility: HOSPITAL | Age: 50
Discharge: HOME | End: 2025-06-09
Attending: EMERGENCY MEDICINE
Payer: COMMERCIAL

## 2025-06-09 VITALS
OXYGEN SATURATION: 97 % | HEART RATE: 88 BPM | RESPIRATION RATE: 20 BRPM | TEMPERATURE: 98.4 F | BODY MASS INDEX: 21.07 KG/M2 | DIASTOLIC BLOOD PRESSURE: 88 MMHG | SYSTOLIC BLOOD PRESSURE: 139 MMHG | HEIGHT: 67 IN | WEIGHT: 134.26 LBS

## 2025-06-09 DIAGNOSIS — Z76.0 ENCOUNTER FOR MEDICATION REFILL: Primary | ICD-10-CM

## 2025-06-09 DIAGNOSIS — F10.10 ALCOHOL ABUSE: ICD-10-CM

## 2025-06-09 LAB
EKG ATRIAL RATE: 79 BPM
EKG DIAGNOSIS: NORMAL
EKG P AXIS: 50 DEGREES
EKG P-R INTERVAL: 126 MS
EKG Q-T INTERVAL: 384 MS
EKG QRS DURATION: 74 MS
EKG QTC CALCULATION (BAZETT): 440 MS
EKG R AXIS: 68 DEGREES
EKG T AXIS: 63 DEGREES
EKG VENTRICULAR RATE: 79 BPM

## 2025-06-09 PROCEDURE — 93010 ELECTROCARDIOGRAM REPORT: CPT | Performed by: INTERNAL MEDICINE

## 2025-06-09 PROCEDURE — 99283 EMERGENCY DEPT VISIT LOW MDM: CPT

## 2025-06-09 RX ORDER — CLONIDINE HYDROCHLORIDE 0.2 MG/1
0.2 TABLET ORAL 2 TIMES DAILY
Qty: 20 TABLET | Refills: 0 | Status: SHIPPED | OUTPATIENT
Start: 2025-06-09

## 2025-06-09 ASSESSMENT — PAIN - FUNCTIONAL ASSESSMENT: PAIN_FUNCTIONAL_ASSESSMENT: NONE - DENIES PAIN

## 2025-06-09 NOTE — ED NOTES
Patient discharged by MD Juliana Martinez pt sent to the front lobby, with strong and steady gait, no acute distress noted at time of discharge - Discharge information / home RX / and reasons to return to the ED were reviewed by the ED provider.
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
## 2025-06-10 NOTE — ED PROVIDER NOTES
display    CONSULTS:  None    PROCEDURES:     Procedures      FINAL IMPRESSION      1. Encounter for medication refill    2. Alcohol abuse          DISPOSITION/PLAN   DISPOSITION Decision To Discharge 06/09/2025 03:40:46 AM      PATIENT REFERRED TO:  Jose Lr MD  [address]  [address]  [facility] [address] [phone]    Schedule an appointment as soon as possible for a visit   As needed, for re-evaluation    Sejal Gould MD  [address]  [address]  [facility] [address] [phone]    Schedule an appointment as soon as possible for a visit   As needed, for re-evaluation for refills of psychiatric medications      DISCHARGE MEDICATIONS:  Discharge Medication List as of 6/9/2025  3:44 AM            (Please note that portions of this note were completed with a transcription program.  Efforts were made to edit the dictations but occasionally words are mis-transcribed.)    Dennis Martinez MD (electronically signed)            Dennis Martinez MD  06/10/25 0606

## 2025-08-06 ENCOUNTER — APPOINTMENT (OUTPATIENT)
Facility: HOSPITAL | Age: 50
End: 2025-08-06
Payer: COMMERCIAL

## 2025-08-06 ENCOUNTER — HOSPITAL ENCOUNTER (EMERGENCY)
Facility: HOSPITAL | Age: 50
Discharge: HOME | End: 2025-08-06
Attending: STUDENT IN AN ORGANIZED HEALTH CARE EDUCATION/TRAINING PROGRAM
Payer: COMMERCIAL

## 2025-08-06 VITALS
OXYGEN SATURATION: 100 % | HEART RATE: 88 BPM | TEMPERATURE: 97.5 F | RESPIRATION RATE: 18 BRPM | SYSTOLIC BLOOD PRESSURE: 156 MMHG | DIASTOLIC BLOOD PRESSURE: 108 MMHG

## 2025-08-06 DIAGNOSIS — S09.90XA INJURY OF HEAD, INITIAL ENCOUNTER: ICD-10-CM
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
## 2025-08-06 LAB
ALBUMIN SERPL-MCNC: 4 G/DL (ref 3.5–5.2)
ALBUMIN/GLOB SERPL: 1.3 (ref 1.1–2.2)
ALP SERPL-CCNC: 69 U/L (ref 35–104)
ALT SERPL-CCNC: 21 U/L (ref 10–35)
ANION GAP SERPL CALC-SCNC: 12 MMOL/L (ref 2–14)
AST SERPL-CCNC: 34 U/L (ref 10–35)
BASOPHILS # BLD: 0.09 K/UL (ref 0–0.1)
BASOPHILS NFR BLD: 1 % (ref 0–1)
BILIRUB SERPL-MCNC: <0.2 MG/DL (ref 0–1.2)
BUN SERPL-MCNC: 15 MG/DL (ref 6–20)
BUN/CREAT SERPL: 22 (ref 12–20)
CALCIUM SERPL-MCNC: 8.8 MG/DL (ref 8.6–10)
CHLORIDE SERPL-SCNC: 101 MMOL/L (ref 98–107)
CO2 SERPL-SCNC: 21 MMOL/L (ref 20–29)
COMMENT:: NORMAL
CREAT SERPL-MCNC: 0.65 MG/DL (ref 0.6–1)
DIFFERENTIAL METHOD BLD: ABNORMAL
EOSINOPHIL # BLD: 0.19 K/UL (ref 0–0.4)
EOSINOPHIL NFR BLD: 2.1 % (ref 0–7)
ERYTHROCYTE [DISTWIDTH] IN BLOOD BY AUTOMATED COUNT: 14.9 % (ref 11.5–14.5)
ETHANOL SERPL-MCNC: <11 MG/DL (ref 0–0.08)
GLOBULIN SER CALC-MCNC: 3.2 G/DL (ref 2–4)
GLUCOSE SERPL-MCNC: 93 MG/DL (ref 65–100)
HCT VFR BLD AUTO: 35.9 % (ref 35–47)
HGB BLD-MCNC: 11.7 G/DL (ref 11.5–16)
IMM GRANULOCYTES # BLD AUTO: 0.03 K/UL (ref 0–0.04)
IMM GRANULOCYTES NFR BLD AUTO: 0.3 % (ref 0–0.5)
LYMPHOCYTES # BLD: 1.49 K/UL (ref 0.8–3.5)
LYMPHOCYTES NFR BLD: 16.3 % (ref 12–49)
MCH RBC QN AUTO: 30.1 PG (ref 26–34)
MCHC RBC AUTO-ENTMCNC: 32.6 G/DL (ref 30–36.5)
MCV RBC AUTO: 92.3 FL (ref 80–99)
MONOCYTES # BLD: 0.6 K/UL (ref 0–1)
MONOCYTES NFR BLD: 6.6 % (ref 5–13)
NEUTS SEG # BLD: 6.76 K/UL (ref 1.8–8)
NEUTS SEG NFR BLD: 73.7 % (ref 32–75)
NRBC # BLD: 0 K/UL (ref 0–0.01)
NRBC BLD-RTO: 0 PER 100 WBC
PLATELET # BLD AUTO: 301 K/UL (ref 150–400)
PMV BLD AUTO: 10.4 FL (ref 8.9–12.9)
POTASSIUM SERPL-SCNC: 3.7 MMOL/L (ref 3.5–5.1)
PROT SERPL-MCNC: 7.2 G/DL (ref 6.4–8.3)
RBC # BLD AUTO: 3.89 M/UL (ref 3.8–5.2)
SODIUM SERPL-SCNC: 134 MMOL/L (ref 136–145)
SPECIMEN HOLD: NORMAL
WBC # BLD AUTO: 9.2 K/UL (ref 3.6–11)

## 2025-08-06 PROCEDURE — 85025 COMPLETE CBC W/AUTO DIFF WBC: CPT

## 2025-08-06 PROCEDURE — 80053 COMPREHEN METABOLIC PANEL: CPT

## 2025-08-06 PROCEDURE — 96360 HYDRATION IV INFUSION INIT: CPT

## 2025-08-06 PROCEDURE — 82077 ASSAY SPEC XCP UR&BREATH IA: CPT

## 2025-08-06 PROCEDURE — 99284 EMERGENCY DEPT VISIT MOD MDM: CPT

## 2025-08-06 PROCEDURE — 2580000003 HC RX 258: Performed by: STUDENT IN AN ORGANIZED HEALTH CARE EDUCATION/TRAINING PROGRAM

## 2025-08-06 PROCEDURE — 70450 CT HEAD/BRAIN W/O DYE: CPT

## 2025-08-06 RX ORDER — 0.9 % SODIUM CHLORIDE 0.9 %
1000 INTRAVENOUS SOLUTION INTRAVENOUS ONCE
Status: COMPLETED | OUTPATIENT
Start: 2025-08-06 | End: 2025-08-06

## 2025-08-06 RX ADMIN — SODIUM CHLORIDE 1000 ML: 9 INJECTION, SOLUTION INTRAVENOUS at 06:37

## 2025-08-06 ASSESSMENT — PAIN SCALES - GENERAL: PAINLEVEL_OUTOF10: 0

## 2025-08-06 ASSESSMENT — PAIN - FUNCTIONAL ASSESSMENT: PAIN_FUNCTIONAL_ASSESSMENT: 0-10
